# Patient Record
Sex: FEMALE | Race: WHITE | NOT HISPANIC OR LATINO | Employment: OTHER | URBAN - METROPOLITAN AREA
[De-identification: names, ages, dates, MRNs, and addresses within clinical notes are randomized per-mention and may not be internally consistent; named-entity substitution may affect disease eponyms.]

---

## 2021-12-20 ENCOUNTER — HOSPITAL ENCOUNTER (OUTPATIENT)
Facility: MEDICAL CENTER | Age: 81
End: 2021-12-21
Attending: EMERGENCY MEDICINE | Admitting: INTERNAL MEDICINE
Payer: MEDICARE

## 2021-12-20 ENCOUNTER — APPOINTMENT (OUTPATIENT)
Dept: RADIOLOGY | Facility: MEDICAL CENTER | Age: 81
End: 2021-12-20
Attending: EMERGENCY MEDICINE
Payer: MEDICARE

## 2021-12-20 ENCOUNTER — ANESTHESIA EVENT (OUTPATIENT)
Dept: SURGERY | Facility: MEDICAL CENTER | Age: 81
End: 2021-12-20
Payer: MEDICARE

## 2021-12-20 ENCOUNTER — ANESTHESIA (OUTPATIENT)
Dept: SURGERY | Facility: MEDICAL CENTER | Age: 81
End: 2021-12-20
Payer: MEDICARE

## 2021-12-20 DIAGNOSIS — K35.80 ACUTE APPENDICITIS, UNSPECIFIED ACUTE APPENDICITIS TYPE: ICD-10-CM

## 2021-12-20 DIAGNOSIS — R71.8 MICROCYTOSIS: ICD-10-CM

## 2021-12-20 PROBLEM — K37 APPENDICITIS: Status: ACTIVE | Noted: 2021-12-20

## 2021-12-20 PROBLEM — D72.829 LEUKOCYTOSIS: Status: ACTIVE | Noted: 2021-12-20

## 2021-12-20 PROBLEM — D75.839 THROMBOCYTOSIS: Status: ACTIVE | Noted: 2021-12-20

## 2021-12-20 PROBLEM — Z86.73 HISTORY OF STROKE: Status: ACTIVE | Noted: 2021-12-20

## 2021-12-20 PROBLEM — K80.20 CHOLELITHIASIS: Status: ACTIVE | Noted: 2021-12-20

## 2021-12-20 LAB
ALBUMIN SERPL BCP-MCNC: 4.1 G/DL (ref 3.2–4.9)
ALBUMIN/GLOB SERPL: 1 G/DL
ALP SERPL-CCNC: 111 U/L (ref 30–99)
ALT SERPL-CCNC: 14 U/L (ref 2–50)
AMORPH CRY #/AREA URNS HPF: PRESENT /HPF
ANION GAP SERPL CALC-SCNC: 13 MMOL/L (ref 7–16)
ANISOCYTOSIS BLD QL SMEAR: ABNORMAL
APPEARANCE UR: CLEAR
AST SERPL-CCNC: 19 U/L (ref 12–45)
BACTERIA #/AREA URNS HPF: NEGATIVE /HPF
BASOPHILS # BLD AUTO: 1 % (ref 0–1.8)
BASOPHILS # BLD: 0.18 K/UL (ref 0–0.12)
BILIRUB SERPL-MCNC: 0.4 MG/DL (ref 0.1–1.5)
BILIRUB UR QL STRIP.AUTO: NEGATIVE
BUN SERPL-MCNC: 15 MG/DL (ref 8–22)
CALCIUM SERPL-MCNC: 9.7 MG/DL (ref 8.4–10.2)
CHLORIDE SERPL-SCNC: 103 MMOL/L (ref 96–112)
CO2 SERPL-SCNC: 24 MMOL/L (ref 20–33)
COLOR UR: YELLOW
CREAT SERPL-MCNC: 0.77 MG/DL (ref 0.5–1.4)
EOSINOPHIL # BLD AUTO: 0.18 K/UL (ref 0–0.51)
EOSINOPHIL NFR BLD: 1 % (ref 0–6.9)
EPI CELLS #/AREA URNS HPF: ABNORMAL /HPF
ERYTHROCYTE [DISTWIDTH] IN BLOOD BY AUTOMATED COUNT: 51.7 FL (ref 35.9–50)
GLOBULIN SER CALC-MCNC: 4.3 G/DL (ref 1.9–3.5)
GLUCOSE SERPL-MCNC: 94 MG/DL (ref 65–99)
GLUCOSE UR STRIP.AUTO-MCNC: NEGATIVE MG/DL
HCT VFR BLD AUTO: 47.9 % (ref 37–47)
HGB BLD-MCNC: 13.9 G/DL (ref 12–16)
HYALINE CASTS #/AREA URNS LPF: ABNORMAL /LPF
HYPOCHROMIA BLD QL SMEAR: ABNORMAL
KETONES UR STRIP.AUTO-MCNC: NEGATIVE MG/DL
LEUKOCYTE ESTERASE UR QL STRIP.AUTO: NEGATIVE
LIPASE SERPL-CCNC: 14 U/L (ref 7–58)
LYMPHOCYTES # BLD AUTO: 3.28 K/UL (ref 1–4.8)
LYMPHOCYTES NFR BLD: 18 % (ref 22–41)
MANUAL DIFF BLD: NORMAL
MCH RBC QN AUTO: 20.3 PG (ref 27–33)
MCHC RBC AUTO-ENTMCNC: 29 G/DL (ref 33.6–35)
MCV RBC AUTO: 69.8 FL (ref 81.4–97.8)
MICRO URNS: ABNORMAL
MICROCYTES BLD QL SMEAR: ABNORMAL
MONOCYTES # BLD AUTO: 1.09 K/UL (ref 0–0.85)
MONOCYTES NFR BLD AUTO: 6 % (ref 0–13.4)
NEUTROPHILS # BLD AUTO: 13.47 K/UL (ref 2–7.15)
NEUTROPHILS NFR BLD: 74 % (ref 44–72)
NITRITE UR QL STRIP.AUTO: NEGATIVE
NRBC # BLD AUTO: 0 K/UL
NRBC BLD-RTO: 0 /100 WBC
PATHOLOGY CONSULT NOTE: NORMAL
PH UR STRIP.AUTO: 6 [PH] (ref 5–8)
PLATELET # BLD AUTO: 656 K/UL (ref 164–446)
PLATELET BLD QL SMEAR: NORMAL
PMV BLD AUTO: 10.7 FL (ref 9–12.9)
POTASSIUM SERPL-SCNC: 4.2 MMOL/L (ref 3.6–5.5)
PROT SERPL-MCNC: 8.4 G/DL (ref 6–8.2)
PROT UR QL STRIP: NEGATIVE MG/DL
RBC # BLD AUTO: 6.86 M/UL (ref 4.2–5.4)
RBC # URNS HPF: ABNORMAL /HPF
RBC BLD AUTO: PRESENT
RBC UR QL AUTO: ABNORMAL
SARS-COV+SARS-COV-2 AG RESP QL IA.RAPID: NOTDETECTED
SODIUM SERPL-SCNC: 140 MMOL/L (ref 135–145)
SP GR UR STRIP.AUTO: 1.01
SPECIMEN SOURCE: NORMAL
WBC # BLD AUTO: 18.2 K/UL (ref 4.8–10.8)
WBC #/AREA URNS HPF: ABNORMAL /HPF

## 2021-12-20 PROCEDURE — 160009 HCHG ANES TIME/MIN: Performed by: SURGERY

## 2021-12-20 PROCEDURE — 502703 HCHG DEVICE, LIGASURE V SEALER: Performed by: SURGERY

## 2021-12-20 PROCEDURE — 85027 COMPLETE CBC AUTOMATED: CPT

## 2021-12-20 PROCEDURE — 74177 CT ABD & PELVIS W/CONTRAST: CPT

## 2021-12-20 PROCEDURE — 501583 HCHG TROCAR, THRD CAN&SEAL 5X100: Performed by: SURGERY

## 2021-12-20 PROCEDURE — 93005 ELECTROCARDIOGRAM TRACING: CPT | Performed by: INTERNAL MEDICINE

## 2021-12-20 PROCEDURE — 501399 HCHG SPECIMAN BAG, ENDO CATC: Performed by: SURGERY

## 2021-12-20 PROCEDURE — 700111 HCHG RX REV CODE 636 W/ 250 OVERRIDE (IP): Performed by: INTERNAL MEDICINE

## 2021-12-20 PROCEDURE — 501429 HCHG STAPLER, ENDO GIA UNIV: Performed by: SURGERY

## 2021-12-20 PROCEDURE — 81001 URINALYSIS AUTO W/SCOPE: CPT

## 2021-12-20 PROCEDURE — 700117 HCHG RX CONTRAST REV CODE 255: Performed by: EMERGENCY MEDICINE

## 2021-12-20 PROCEDURE — 500868 HCHG NEEDLE, SURGI(VARES): Performed by: SURGERY

## 2021-12-20 PROCEDURE — 160035 HCHG PACU - 1ST 60 MINS PHASE I: Performed by: SURGERY

## 2021-12-20 PROCEDURE — G0378 HOSPITAL OBSERVATION PER HR: HCPCS

## 2021-12-20 PROCEDURE — A9270 NON-COVERED ITEM OR SERVICE: HCPCS | Performed by: INTERNAL MEDICINE

## 2021-12-20 PROCEDURE — 500378 HCHG DRAIN, J-VAC ROUND 19FR: Performed by: SURGERY

## 2021-12-20 PROCEDURE — 87426 SARSCOV CORONAVIRUS AG IA: CPT

## 2021-12-20 PROCEDURE — 501570 HCHG TROCAR, SEPARATOR: Performed by: SURGERY

## 2021-12-20 PROCEDURE — 700101 HCHG RX REV CODE 250: Performed by: STUDENT IN AN ORGANIZED HEALTH CARE EDUCATION/TRAINING PROGRAM

## 2021-12-20 PROCEDURE — 700105 HCHG RX REV CODE 258: Performed by: INTERNAL MEDICINE

## 2021-12-20 PROCEDURE — 501838 HCHG SUTURE GENERAL: Performed by: SURGERY

## 2021-12-20 PROCEDURE — 160002 HCHG RECOVERY MINUTES (STAT): Performed by: SURGERY

## 2021-12-20 PROCEDURE — 502571 HCHG PACK, LAP CHOLE: Performed by: SURGERY

## 2021-12-20 PROCEDURE — 88304 TISSUE EXAM BY PATHOLOGIST: CPT

## 2021-12-20 PROCEDURE — 83690 ASSAY OF LIPASE: CPT

## 2021-12-20 PROCEDURE — 700111 HCHG RX REV CODE 636 W/ 250 OVERRIDE (IP): Performed by: STUDENT IN AN ORGANIZED HEALTH CARE EDUCATION/TRAINING PROGRAM

## 2021-12-20 PROCEDURE — 500002 HCHG ADHESIVE, DERMABOND: Performed by: SURGERY

## 2021-12-20 PROCEDURE — 700102 HCHG RX REV CODE 250 W/ 637 OVERRIDE(OP): Performed by: INTERNAL MEDICINE

## 2021-12-20 PROCEDURE — 96365 THER/PROPH/DIAG IV INF INIT: CPT | Mod: XU

## 2021-12-20 PROCEDURE — 99220 PR INITIAL OBSERVATION CARE,LEVL III: CPT | Performed by: INTERNAL MEDICINE

## 2021-12-20 PROCEDURE — 36415 COLL VENOUS BLD VENIPUNCTURE: CPT

## 2021-12-20 PROCEDURE — 160041 HCHG SURGERY MINUTES - EA ADDL 1 MIN LEVEL 4: Performed by: SURGERY

## 2021-12-20 PROCEDURE — 80053 COMPREHEN METABOLIC PANEL: CPT

## 2021-12-20 PROCEDURE — 501463 HCHG STAPLES, UNIV. ROTIC: Performed by: SURGERY

## 2021-12-20 PROCEDURE — 160029 HCHG SURGERY MINUTES - 1ST 30 MINS LEVEL 4: Performed by: SURGERY

## 2021-12-20 PROCEDURE — 700101 HCHG RX REV CODE 250: Performed by: SURGERY

## 2021-12-20 PROCEDURE — 160048 HCHG OR STATISTICAL LEVEL 1-5: Performed by: SURGERY

## 2021-12-20 PROCEDURE — 99291 CRITICAL CARE FIRST HOUR: CPT

## 2021-12-20 PROCEDURE — 85007 BL SMEAR W/DIFF WBC COUNT: CPT

## 2021-12-20 RX ORDER — SODIUM CHLORIDE, SODIUM LACTATE, POTASSIUM CHLORIDE, CALCIUM CHLORIDE 600; 310; 30; 20 MG/100ML; MG/100ML; MG/100ML; MG/100ML
INJECTION, SOLUTION INTRAVENOUS CONTINUOUS
Status: DISCONTINUED | OUTPATIENT
Start: 2021-12-20 | End: 2021-12-20 | Stop reason: HOSPADM

## 2021-12-20 RX ORDER — BUPIVACAINE HYDROCHLORIDE AND EPINEPHRINE 5; 5 MG/ML; UG/ML
INJECTION, SOLUTION EPIDURAL; INTRACAUDAL; PERINEURAL
Status: DISCONTINUED | OUTPATIENT
Start: 2021-12-20 | End: 2021-12-20 | Stop reason: HOSPADM

## 2021-12-20 RX ORDER — OXYCODONE HYDROCHLORIDE 5 MG/1
2.5 TABLET ORAL
Status: DISCONTINUED | OUTPATIENT
Start: 2021-12-20 | End: 2021-12-21 | Stop reason: HOSPADM

## 2021-12-20 RX ORDER — ROSUVASTATIN CALCIUM 10 MG/1
5 TABLET, COATED ORAL DAILY
Status: DISCONTINUED | OUTPATIENT
Start: 2021-12-21 | End: 2021-12-21 | Stop reason: HOSPADM

## 2021-12-20 RX ORDER — ONDANSETRON 2 MG/ML
4 INJECTION INTRAMUSCULAR; INTRAVENOUS EVERY 4 HOURS PRN
Status: DISCONTINUED | OUTPATIENT
Start: 2021-12-20 | End: 2021-12-21 | Stop reason: HOSPADM

## 2021-12-20 RX ORDER — PHENYLEPHRINE HYDROCHLORIDE 10 MG/ML
INJECTION, SOLUTION INTRAMUSCULAR; INTRAVENOUS; SUBCUTANEOUS PRN
Status: DISCONTINUED | OUTPATIENT
Start: 2021-12-20 | End: 2021-12-20 | Stop reason: SURG

## 2021-12-20 RX ORDER — ONDANSETRON 2 MG/ML
4 INJECTION INTRAMUSCULAR; INTRAVENOUS
Status: DISCONTINUED | OUTPATIENT
Start: 2021-12-20 | End: 2021-12-20 | Stop reason: HOSPADM

## 2021-12-20 RX ORDER — ACETAMINOPHEN 325 MG/1
650 TABLET ORAL EVERY 6 HOURS PRN
Status: DISCONTINUED | OUTPATIENT
Start: 2021-12-20 | End: 2021-12-21 | Stop reason: HOSPADM

## 2021-12-20 RX ORDER — ROCURONIUM BROMIDE 10 MG/ML
INJECTION, SOLUTION INTRAVENOUS PRN
Status: DISCONTINUED | OUTPATIENT
Start: 2021-12-20 | End: 2021-12-20 | Stop reason: SURG

## 2021-12-20 RX ORDER — ROSUVASTATIN CALCIUM 5 MG/1
5 TABLET, COATED ORAL DAILY
COMMUNITY

## 2021-12-20 RX ORDER — LIDOCAINE HYDROCHLORIDE 20 MG/ML
INJECTION, SOLUTION EPIDURAL; INFILTRATION; INTRACAUDAL; PERINEURAL PRN
Status: DISCONTINUED | OUTPATIENT
Start: 2021-12-20 | End: 2021-12-20 | Stop reason: SURG

## 2021-12-20 RX ORDER — SODIUM CHLORIDE, SODIUM LACTATE, POTASSIUM CHLORIDE, CALCIUM CHLORIDE 600; 310; 30; 20 MG/100ML; MG/100ML; MG/100ML; MG/100ML
INJECTION, SOLUTION INTRAVENOUS CONTINUOUS
Status: DISCONTINUED | OUTPATIENT
Start: 2021-12-20 | End: 2021-12-20

## 2021-12-20 RX ORDER — AMOXICILLIN 250 MG
2 CAPSULE ORAL 2 TIMES DAILY
Status: DISCONTINUED | OUTPATIENT
Start: 2021-12-20 | End: 2021-12-21 | Stop reason: HOSPADM

## 2021-12-20 RX ORDER — HYDROMORPHONE HYDROCHLORIDE 1 MG/ML
0.1 INJECTION, SOLUTION INTRAMUSCULAR; INTRAVENOUS; SUBCUTANEOUS
Status: DISCONTINUED | OUTPATIENT
Start: 2021-12-20 | End: 2021-12-20 | Stop reason: HOSPADM

## 2021-12-20 RX ORDER — OXYCODONE HYDROCHLORIDE 5 MG/1
5 TABLET ORAL
Status: DISCONTINUED | OUTPATIENT
Start: 2021-12-20 | End: 2021-12-21 | Stop reason: HOSPADM

## 2021-12-20 RX ORDER — HYDROMORPHONE HYDROCHLORIDE 1 MG/ML
0.2 INJECTION, SOLUTION INTRAMUSCULAR; INTRAVENOUS; SUBCUTANEOUS
Status: DISCONTINUED | OUTPATIENT
Start: 2021-12-20 | End: 2021-12-20 | Stop reason: HOSPADM

## 2021-12-20 RX ORDER — CEFOTETAN DISODIUM 2 G/20ML
INJECTION, POWDER, FOR SOLUTION INTRAMUSCULAR; INTRAVENOUS PRN
Status: DISCONTINUED | OUTPATIENT
Start: 2021-12-20 | End: 2021-12-20 | Stop reason: SURG

## 2021-12-20 RX ORDER — ONDANSETRON 4 MG/1
4 TABLET, ORALLY DISINTEGRATING ORAL EVERY 4 HOURS PRN
Status: DISCONTINUED | OUTPATIENT
Start: 2021-12-20 | End: 2021-12-21 | Stop reason: HOSPADM

## 2021-12-20 RX ORDER — HALOPERIDOL 5 MG/ML
1 INJECTION INTRAMUSCULAR
Status: DISCONTINUED | OUTPATIENT
Start: 2021-12-20 | End: 2021-12-20 | Stop reason: HOSPADM

## 2021-12-20 RX ORDER — BISACODYL 10 MG
10 SUPPOSITORY, RECTAL RECTAL
Status: DISCONTINUED | OUTPATIENT
Start: 2021-12-20 | End: 2021-12-21 | Stop reason: HOSPADM

## 2021-12-20 RX ORDER — POLYETHYLENE GLYCOL 3350 17 G/17G
1 POWDER, FOR SOLUTION ORAL
Status: DISCONTINUED | OUTPATIENT
Start: 2021-12-20 | End: 2021-12-21 | Stop reason: HOSPADM

## 2021-12-20 RX ORDER — OXYCODONE HCL 5 MG/5 ML
5 SOLUTION, ORAL ORAL
Status: DISCONTINUED | OUTPATIENT
Start: 2021-12-20 | End: 2021-12-20 | Stop reason: HOSPADM

## 2021-12-20 RX ORDER — METRONIDAZOLE 500 MG/1
500 TABLET ORAL EVERY 8 HOURS
Status: DISCONTINUED | OUTPATIENT
Start: 2021-12-20 | End: 2021-12-21 | Stop reason: HOSPADM

## 2021-12-20 RX ORDER — DEXAMETHASONE SODIUM PHOSPHATE 4 MG/ML
INJECTION, SOLUTION INTRA-ARTICULAR; INTRALESIONAL; INTRAMUSCULAR; INTRAVENOUS; SOFT TISSUE PRN
Status: DISCONTINUED | OUTPATIENT
Start: 2021-12-20 | End: 2021-12-20 | Stop reason: SURG

## 2021-12-20 RX ORDER — LABETALOL HYDROCHLORIDE 5 MG/ML
10 INJECTION, SOLUTION INTRAVENOUS EVERY 4 HOURS PRN
Status: DISCONTINUED | OUTPATIENT
Start: 2021-12-20 | End: 2021-12-21 | Stop reason: HOSPADM

## 2021-12-20 RX ORDER — OXYCODONE HCL 5 MG/5 ML
10 SOLUTION, ORAL ORAL
Status: DISCONTINUED | OUTPATIENT
Start: 2021-12-20 | End: 2021-12-20 | Stop reason: HOSPADM

## 2021-12-20 RX ORDER — HYDROMORPHONE HYDROCHLORIDE 1 MG/ML
0.4 INJECTION, SOLUTION INTRAMUSCULAR; INTRAVENOUS; SUBCUTANEOUS
Status: DISCONTINUED | OUTPATIENT
Start: 2021-12-20 | End: 2021-12-20 | Stop reason: HOSPADM

## 2021-12-20 RX ORDER — SODIUM CHLORIDE, SODIUM LACTATE, POTASSIUM CHLORIDE, CALCIUM CHLORIDE 600; 310; 30; 20 MG/100ML; MG/100ML; MG/100ML; MG/100ML
INJECTION, SOLUTION INTRAVENOUS CONTINUOUS
Status: CANCELLED | OUTPATIENT
Start: 2021-12-20 | End: 2021-12-20

## 2021-12-20 RX ORDER — HYDROMORPHONE HYDROCHLORIDE 1 MG/ML
0.25 INJECTION, SOLUTION INTRAMUSCULAR; INTRAVENOUS; SUBCUTANEOUS
Status: DISCONTINUED | OUTPATIENT
Start: 2021-12-20 | End: 2021-12-21 | Stop reason: HOSPADM

## 2021-12-20 RX ADMIN — ONDANSETRON 4 MG: 2 INJECTION INTRAMUSCULAR; INTRAVENOUS at 19:27

## 2021-12-20 RX ADMIN — PHENYLEPHRINE HYDROCHLORIDE 100 MCG: 10 INJECTION INTRAVENOUS at 19:06

## 2021-12-20 RX ADMIN — FENTANYL CITRATE 50 MCG: 50 INJECTION, SOLUTION INTRAMUSCULAR; INTRAVENOUS at 18:54

## 2021-12-20 RX ADMIN — LIDOCAINE HYDROCHLORIDE 40 MG: 20 INJECTION, SOLUTION EPIDURAL; INFILTRATION; INTRACAUDAL; PERINEURAL at 18:57

## 2021-12-20 RX ADMIN — FENTANYL CITRATE 50 MCG: 50 INJECTION, SOLUTION INTRAMUSCULAR; INTRAVENOUS at 19:12

## 2021-12-20 RX ADMIN — METRONIDAZOLE 500 MG: 500 TABLET ORAL at 22:03

## 2021-12-20 RX ADMIN — CEFOTETAN DISODIUM 2 G: 2 INJECTION, POWDER, FOR SOLUTION INTRAMUSCULAR; INTRAVENOUS at 19:00

## 2021-12-20 RX ADMIN — FENTANYL CITRATE 50 MCG: 50 INJECTION, SOLUTION INTRAMUSCULAR; INTRAVENOUS at 19:18

## 2021-12-20 RX ADMIN — ROCURONIUM BROMIDE 50 MG: 10 INJECTION, SOLUTION INTRAVENOUS at 18:57

## 2021-12-20 RX ADMIN — IOHEXOL 100 ML: 350 INJECTION, SOLUTION INTRAVENOUS at 17:14

## 2021-12-20 RX ADMIN — SUGAMMADEX 200 MG: 100 INJECTION, SOLUTION INTRAVENOUS at 19:31

## 2021-12-20 RX ADMIN — DEXAMETHASONE SODIUM PHOSPHATE 4 MG: 4 INJECTION, SOLUTION INTRAMUSCULAR; INTRAVENOUS at 19:09

## 2021-12-20 RX ADMIN — PROPOFOL 130 MG: 10 INJECTION, EMULSION INTRAVENOUS at 18:57

## 2021-12-20 RX ADMIN — SODIUM CHLORIDE, POTASSIUM CHLORIDE, SODIUM LACTATE AND CALCIUM CHLORIDE: 600; 310; 30; 20 INJECTION, SOLUTION INTRAVENOUS at 18:51

## 2021-12-20 ASSESSMENT — LIFESTYLE VARIABLES
HAVE YOU EVER FELT YOU SHOULD CUT DOWN ON YOUR DRINKING: NO
EVER HAD A DRINK FIRST THING IN THE MORNING TO STEADY YOUR NERVES TO GET RID OF A HANGOVER: NO
HOW MANY TIMES IN THE PAST YEAR HAVE YOU HAD 5 OR MORE DRINKS IN A DAY: 0
ON A TYPICAL DAY WHEN YOU DRINK ALCOHOL HOW MANY DRINKS DO YOU HAVE: 0
EVER FELT BAD OR GUILTY ABOUT YOUR DRINKING: NO
CONSUMPTION TOTAL: NEGATIVE
TOTAL SCORE: 0
TOTAL SCORE: 0
ALCOHOL_USE: NO
HAVE PEOPLE ANNOYED YOU BY CRITICIZING YOUR DRINKING: NO
AVERAGE NUMBER OF DAYS PER WEEK YOU HAVE A DRINK CONTAINING ALCOHOL: 0
TOTAL SCORE: 0

## 2021-12-20 ASSESSMENT — ENCOUNTER SYMPTOMS
FEVER: 0
SORE THROAT: 0
SEIZURES: 0
EYE REDNESS: 0
MEMORY LOSS: 0
COUGH: 0
LOSS OF CONSCIOUSNESS: 0
ABDOMINAL PAIN: 1
FALLS: 0
NAUSEA: 0
VOMITING: 0
CHILLS: 0
SHORTNESS OF BREATH: 0
PALPITATIONS: 0
EYE PAIN: 0

## 2021-12-20 ASSESSMENT — COGNITIVE AND FUNCTIONAL STATUS - GENERAL
MOVING FROM LYING ON BACK TO SITTING ON SIDE OF FLAT BED: A LITTLE
STANDING UP FROM CHAIR USING ARMS: A LITTLE
WALKING IN HOSPITAL ROOM: A LITTLE
MOVING TO AND FROM BED TO CHAIR: A LITTLE
DAILY ACTIVITIY SCORE: 18
TURNING FROM BACK TO SIDE WHILE IN FLAT BAD: A LITTLE
MOBILITY SCORE: 18
EATING MEALS: A LITTLE
PERSONAL GROOMING: A LITTLE
HELP NEEDED FOR BATHING: A LITTLE
SUGGESTED CMS G CODE MODIFIER DAILY ACTIVITY: CK
SUGGESTED CMS G CODE MODIFIER MOBILITY: CK
CLIMB 3 TO 5 STEPS WITH RAILING: A LITTLE
DRESSING REGULAR LOWER BODY CLOTHING: A LITTLE
TOILETING: A LITTLE
DRESSING REGULAR UPPER BODY CLOTHING: A LITTLE

## 2021-12-20 ASSESSMENT — PAIN DESCRIPTION - PAIN TYPE
TYPE: SURGICAL PAIN
TYPE: ACUTE PAIN;SURGICAL PAIN

## 2021-12-20 ASSESSMENT — PATIENT HEALTH QUESTIONNAIRE - PHQ9
1. LITTLE INTEREST OR PLEASURE IN DOING THINGS: NOT AT ALL
2. FEELING DOWN, DEPRESSED, IRRITABLE, OR HOPELESS: NOT AT ALL
SUM OF ALL RESPONSES TO PHQ9 QUESTIONS 1 AND 2: 0

## 2021-12-20 NOTE — ED TRIAGE NOTES
82 yo female presents to  Triage with reports of sent over from doctors office for RLQ abdominal pain since Saturday.  Patient denies N/V reports some constipation as well.  Denies fever.      Patient is Covid vaccinated

## 2021-12-20 NOTE — ED NOTES
PIV placed, labs collected, son at bedside    Pt resting on gurney, pt in no acute distress, pt provided call light, instructed to call if needing any assistance, instructed not to get up by self, arabella in lowest position.    ERP at [facility]

## 2021-12-21 VITALS
BODY MASS INDEX: 25.07 KG/M2 | WEIGHT: 146.83 LBS | OXYGEN SATURATION: 93 % | RESPIRATION RATE: 18 BRPM | HEART RATE: 81 BPM | TEMPERATURE: 98.5 F | DIASTOLIC BLOOD PRESSURE: 61 MMHG | HEIGHT: 64 IN | SYSTOLIC BLOOD PRESSURE: 129 MMHG

## 2021-12-21 PROBLEM — K37 APPENDICITIS: Status: RESOLVED | Noted: 2021-12-20 | Resolved: 2021-12-21

## 2021-12-21 LAB
ALBUMIN SERPL BCP-MCNC: 3.9 G/DL (ref 3.2–4.9)
ALBUMIN/GLOB SERPL: 1 G/DL
ALP SERPL-CCNC: 98 U/L (ref 30–99)
ALT SERPL-CCNC: 12 U/L (ref 2–50)
ANION GAP SERPL CALC-SCNC: 12 MMOL/L (ref 7–16)
ANISOCYTOSIS BLD QL SMEAR: ABNORMAL
AST SERPL-CCNC: 15 U/L (ref 12–45)
BASOPHILS # BLD AUTO: 0 % (ref 0–1.8)
BASOPHILS # BLD: 0 K/UL (ref 0–0.12)
BILIRUB SERPL-MCNC: 0.3 MG/DL (ref 0.1–1.5)
BUN SERPL-MCNC: 11 MG/DL (ref 8–22)
CALCIUM SERPL-MCNC: 9.6 MG/DL (ref 8.4–10.2)
CHLORIDE SERPL-SCNC: 104 MMOL/L (ref 96–112)
CO2 SERPL-SCNC: 22 MMOL/L (ref 20–33)
CREAT SERPL-MCNC: 0.58 MG/DL (ref 0.5–1.4)
EKG IMPRESSION: NORMAL
EOSINOPHIL # BLD AUTO: 0.2 K/UL (ref 0–0.51)
EOSINOPHIL NFR BLD: 1 % (ref 0–6.9)
ERYTHROCYTE [DISTWIDTH] IN BLOOD BY AUTOMATED COUNT: 51.4 FL (ref 35.9–50)
FERRITIN SERPL-MCNC: 25.3 NG/ML (ref 10–291)
GLOBULIN SER CALC-MCNC: 4 G/DL (ref 1.9–3.5)
GLUCOSE SERPL-MCNC: 142 MG/DL (ref 65–99)
HCT VFR BLD AUTO: 47.7 % (ref 37–47)
HGB BLD-MCNC: 13.9 G/DL (ref 12–16)
HYPOCHROMIA BLD QL SMEAR: ABNORMAL
IRON SATN MFR SERPL: 5 % (ref 15–55)
IRON SERPL-MCNC: 16 UG/DL (ref 40–170)
LYMPHOCYTES # BLD AUTO: 3.56 K/UL (ref 1–4.8)
LYMPHOCYTES NFR BLD: 18 % (ref 22–41)
MAGNESIUM SERPL-MCNC: 2 MG/DL (ref 1.5–2.5)
MANUAL DIFF BLD: NORMAL
MCH RBC QN AUTO: 20.2 PG (ref 27–33)
MCHC RBC AUTO-ENTMCNC: 29.1 G/DL (ref 33.6–35)
MCV RBC AUTO: 69.4 FL (ref 81.4–97.8)
MICROCYTES BLD QL SMEAR: ABNORMAL
MONOCYTES # BLD AUTO: 0.99 K/UL (ref 0–0.85)
MONOCYTES NFR BLD AUTO: 5 % (ref 0–13.4)
NEUTROPHILS # BLD AUTO: 15.05 K/UL (ref 2–7.15)
NEUTROPHILS NFR BLD: 76 % (ref 44–72)
NRBC # BLD AUTO: 0 K/UL
NRBC BLD-RTO: 0 /100 WBC
PHOSPHATE SERPL-MCNC: 3.6 MG/DL (ref 2.5–4.5)
PLATELET # BLD AUTO: 667 K/UL (ref 164–446)
PLATELET BLD QL SMEAR: NORMAL
PMV BLD AUTO: 10.8 FL (ref 9–12.9)
POTASSIUM SERPL-SCNC: 4.4 MMOL/L (ref 3.6–5.5)
PROT SERPL-MCNC: 7.9 G/DL (ref 6–8.2)
RBC # BLD AUTO: 6.87 M/UL (ref 4.2–5.4)
RBC BLD AUTO: PRESENT
SODIUM SERPL-SCNC: 138 MMOL/L (ref 135–145)
TIBC SERPL-MCNC: 351 UG/DL (ref 250–450)
UIBC SERPL-MCNC: 335 UG/DL (ref 110–370)
WBC # BLD AUTO: 19.8 K/UL (ref 4.8–10.8)

## 2021-12-21 PROCEDURE — 93010 ELECTROCARDIOGRAM REPORT: CPT | Performed by: INTERNAL MEDICINE

## 2021-12-21 PROCEDURE — 83540 ASSAY OF IRON: CPT

## 2021-12-21 PROCEDURE — 82728 ASSAY OF FERRITIN: CPT

## 2021-12-21 PROCEDURE — 700111 HCHG RX REV CODE 636 W/ 250 OVERRIDE (IP): Performed by: INTERNAL MEDICINE

## 2021-12-21 PROCEDURE — 700105 HCHG RX REV CODE 258: Performed by: INTERNAL MEDICINE

## 2021-12-21 PROCEDURE — 85027 COMPLETE CBC AUTOMATED: CPT

## 2021-12-21 PROCEDURE — 99217 PR OBSERVATION CARE DISCHARGE: CPT | Performed by: FAMILY MEDICINE

## 2021-12-21 PROCEDURE — 85007 BL SMEAR W/DIFF WBC COUNT: CPT

## 2021-12-21 PROCEDURE — 96367 TX/PROPH/DG ADDL SEQ IV INF: CPT

## 2021-12-21 PROCEDURE — G0378 HOSPITAL OBSERVATION PER HR: HCPCS

## 2021-12-21 PROCEDURE — 83735 ASSAY OF MAGNESIUM: CPT

## 2021-12-21 PROCEDURE — A9270 NON-COVERED ITEM OR SERVICE: HCPCS | Performed by: INTERNAL MEDICINE

## 2021-12-21 PROCEDURE — 83550 IRON BINDING TEST: CPT

## 2021-12-21 PROCEDURE — 84100 ASSAY OF PHOSPHORUS: CPT

## 2021-12-21 PROCEDURE — 36415 COLL VENOUS BLD VENIPUNCTURE: CPT

## 2021-12-21 PROCEDURE — 700102 HCHG RX REV CODE 250 W/ 637 OVERRIDE(OP): Performed by: INTERNAL MEDICINE

## 2021-12-21 PROCEDURE — 80053 COMPREHEN METABOLIC PANEL: CPT

## 2021-12-21 RX ORDER — METRONIDAZOLE 500 MG/1
500 TABLET ORAL EVERY 8 HOURS
Qty: 21 TABLET | Refills: 0 | Status: SHIPPED | OUTPATIENT
Start: 2021-12-21 | End: 2021-12-28

## 2021-12-21 RX ORDER — FERROUS SULFATE 325(65) MG
325 TABLET ORAL DAILY
Qty: 30 TABLET | Refills: 0 | Status: SHIPPED | OUTPATIENT
Start: 2021-12-21 | End: 2021-12-21

## 2021-12-21 RX ORDER — CEFDINIR 300 MG/1
300 CAPSULE ORAL 2 TIMES DAILY
Qty: 14 CAPSULE | Refills: 0 | Status: SHIPPED | OUTPATIENT
Start: 2021-12-21 | End: 2021-12-28

## 2021-12-21 RX ADMIN — CEFTRIAXONE SODIUM 1 G: 1 INJECTION, POWDER, FOR SOLUTION INTRAMUSCULAR; INTRAVENOUS at 06:00

## 2021-12-21 RX ADMIN — ACETAMINOPHEN 650 MG: 325 TABLET, FILM COATED ORAL at 12:48

## 2021-12-21 RX ADMIN — ACETAMINOPHEN 650 MG: 325 TABLET, FILM COATED ORAL at 06:00

## 2021-12-21 RX ADMIN — SENNOSIDES AND DOCUSATE SODIUM 2 TABLET: 50; 8.6 TABLET ORAL at 06:01

## 2021-12-21 RX ADMIN — ROSUVASTATIN 5 MG: 10 TABLET, FILM COATED ORAL at 06:01

## 2021-12-21 RX ADMIN — METRONIDAZOLE 500 MG: 500 TABLET ORAL at 06:01

## 2021-12-21 ASSESSMENT — PAIN DESCRIPTION - PAIN TYPE
TYPE: SURGICAL PAIN
TYPE: SURGICAL PAIN

## 2021-12-21 NOTE — ANESTHESIA POSTPROCEDURE EVALUATION
Patient: Rose Marie Romero    Procedure Summary     Date: 12/20/21 Room / Location: [place] [address] / SURGERY [facility]    Anesthesia Start: 1851 Anesthesia Stop: 1940    Procedure: APPENDECTOMY, LAPAROSCOPIC (N/A Abdomen) Diagnosis: (acute appendicitis)    Surgeons: Berhane Arango M.D. Responsible Provider: Kevin Ryan M.D.    Anesthesia Type: general ASA Status: 2          Final Anesthesia Type: general  Last vitals  BP   Blood Pressure : 160/70    Temp   36.1 °C (97 °F)    Pulse   78   Resp   16    SpO2   93 %      Anesthesia Post Evaluation    Patient location during evaluation: PACU  Patient participation: complete - patient participated  Level of consciousness: awake and alert    Airway patency: patent  Anesthetic complications: no  Cardiovascular status: hemodynamically stable  Respiratory status: acceptable  Hydration status: euvolemic    PONV: none          No complications documented.     Nurse Pain Score: 3 (NPRS)

## 2021-12-21 NOTE — PROGRESS NOTES
Received bedside report. Assumed pt care. Assessment and chart check complete.   Pt is AA0X4, no signs of distress, denies nausea/vomiting and pain at this moment.   X3 lap abdominal  stabs, dermabond CDI.  0900- From Dr. Nba leal to discharge today, follow up given thru voalte,  Fall precautions in place, treaded socks on pt, bed in low position. Call light within reach. Educated pt to call if needing anything.

## 2021-12-21 NOTE — ANESTHESIA PROCEDURE NOTES
Airway    Date/Time: 12/20/2021 6:59 PM  Performed by: Kevin Ryan M.D.  Authorized by: Kevin Ryan M.D.     Location:  OR  Urgency:  Elective  Indications for Airway Management:  Anesthesia      Spontaneous Ventilation: absent    Sedation Level:  Deep  Preoxygenated: Yes    Patient Position:  Sniffing  Final Airway Type:  Endotracheal airway  Final Endotracheal Airway:  ETT  Cuffed: Yes    Technique Used for Successful ETT Placement:  Direct laryngoscopy    Insertion Site:  Oral  Blade Type:  Ceferino  Laryngoscope Blade/Videolaryngoscope Blade Size:  3  ETT Size (mm):  7.0  Measured from:  Teeth  ETT to Teeth (cm):  22  Placement Verified by: auscultation and capnometry    Cormack-Lehane Classification:  Grade IIa - partial view of glottis  Number of Attempts at Approach:  1

## 2021-12-21 NOTE — ED NOTES
Med rec updated and complete, per pt and [facility] from [facility] [phone]  Allergies reviewed, per pt

## 2021-12-21 NOTE — ED PROVIDER NOTES
ED Provider Note    CHIEF COMPLAINT  Chief Complaint   Patient presents with   • Abdominal Pain     since satruday RLQ       HPI  Rose Marie Romero is a 81 y.o. female who presents to the emergency department with right lower quadrant abdominal pain. Past medical history as documented below. Earlier today presented to urgent care which ultimately sent her to the ER with focal right lower quadrant discomfort. She does note that she has had prior hysterectomy but no other abdominal surgeries such as appendectomy or cholecystectomy she is not had a nausea vomiting. She has been constipated. No urinary changes from baseline other than slight frequency and urgency but states that this is her typical. No fever chills. Pain currently mild. Pain is crampy in nature. Becomes more moderate at times often after bending over.    REVIEW OF SYSTEMS  See HPI for further details. All other systems are negative.     PAST MEDICAL HISTORY   has a past medical history of Bursitis, IBS (irritable bowel syndrome), and Polycythemia rubra vera (HCC).    SOCIAL HISTORY  Social History     Tobacco Use   • Smoking status: Never Smoker   • Smokeless tobacco: Never Used   Vaping Use   • Vaping Use: Never used   Substance and Sexual Activity   • Alcohol use: Never   • Drug use: Never   • Sexual activity: Not on file       SURGICAL HISTORY   has a past surgical history that includes gyn surgery and other.    CURRENT MEDICATIONS  Home Medications     Reviewed by Darby Velez R.N. (Registered Nurse) on 12/20/21 at 1824  Med List Status: Complete   Medication Last Dose Status   Acetaminophen (TYLENOL PO) >1 week Active   acetaminophen (Tylenol) tablet 650 mg  Active   aspirin EC (ECOTRIN) 81 MG Tablet Delayed Response 12/20/2021 Active   bisacodyl (DULCOLAX) suppository 10 mg  Active   Cholecalciferol (D3 PO) 12/20/2021 Active   enoxaparin (LOVENOX) inj 40 mg  Active   HYDROmorphone (Dilaudid) injection 0.25 mg  Active   labetalol  "(NORMODYNE/TRANDATE) injection 10 mg  Active   lactated ringers infusion  Active   magnesium hydroxide (MILK OF MAGNESIA) suspension 30 mL  Active   MAGNESIUM PO 12/19/2021 Active   ondansetron (ZOFRAN ODT) dispertab 4 mg  Active   ondansetron (ZOFRAN) syringe/vial injection 4 mg  Active   oxyCODONE immediate-release (ROXICODONE) tablet 2.5 mg  Active   oxyCODONE immediate-release (ROXICODONE) tablet 5 mg  Active   Pharmacy Consult Request ...Pain Management Review 1 Each  Active   polyethylene glycol/lytes (MIRALAX) PACKET 1 Packet  Active   rosuvastatin (CRESTOR) 5 MG Tab 12/20/2021 Active   rosuvastatin (CRESTOR) tablet 5 mg  Active   senna-docusate (PERICOLACE or SENOKOT S) 8.6-50 MG per tablet 2 Tablet  Active                ALLERGIES  Allergies   Allergen Reactions   • Doxycycline      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   • Morphine Itching   • Other Drug Itching     Any medications that effect eyes    • Penicillins Nausea       PHYSICAL EXAM  VITAL SIGNS: /70   Pulse 78   Temp 36.1 °C (97 °F) (Temporal)   Resp 16   Ht 1.626 m (5' 4\")   Wt 66.6 kg (146 lb 13.2 oz)   SpO2 93%   BMI 25.20 kg/m²  @CHAIM[[id number]::@   Pulse ox interpretation: I interpret this pulse ox as normal.  Constitutional: Alert in no apparent distress.  HENT: No signs of trauma, Bilateral external ears normal, Nose normal.   Eyes: Pupils are equal and reactive  Neck: Normal range of motion, No tenderness, Supple  Cardiovascular: Regular rate and rhythm, no murmurs.   Thorax & Lungs: Normal breath sounds, No respiratory distress, No wheezing, No chest tenderness.   Abdomen: Bowel sounds normal, Soft, focal RLQ tenderness w/o guarding or rebound  Skin: Warm, Dry, No erythema, No rash.   Extremities: Intact distal pulses  Musculoskeletal: Good range of motion in all major joints. No tenderness to palpation or major deformities noted.   Neurologic: Alert , Normal motor function, Normal sensory function, No focal " deficits noted.   Psychiatric: Affect normal, Judgment normal, Mood normal.       DIAGNOSTIC STUDIES / PROCEDURES    LABS  Results for orders placed or performed during the hospital encounter of 12/20/21   CBC WITH DIFFERENTIAL   Result Value Ref Range    WBC 18.2 (H) 4.8 - 10.8 K/uL    RBC 6.86 (H) 4.20 - 5.40 M/uL    Hemoglobin 13.9 12.0 - 16.0 g/dL    Hematocrit 47.9 (H) 37.0 - 47.0 %    MCV 69.8 (L) 81.4 - 97.8 fL    MCH 20.3 (L) 27.0 - 33.0 pg    MCHC 29.0 (L) 33.6 - 35.0 g/dL    RDW 51.7 (H) 35.9 - 50.0 fL    Platelet Count 656 (H) 164 - 446 K/uL    MPV 10.7 9.0 - 12.9 fL    Neutrophils-Polys 74.00 (H) 44.00 - 72.00 %    Lymphocytes 18.00 (L) 22.00 - 41.00 %    Monocytes 6.00 0.00 - 13.40 %    Eosinophils 1.00 0.00 - 6.90 %    Basophils 1.00 0.00 - 1.80 %    Nucleated RBC 0.00 /100 WBC    Neutrophils (Absolute) 13.47 (H) 2.00 - 7.15 K/uL    Lymphs (Absolute) 3.28 1.00 - 4.80 K/uL    Monos (Absolute) 1.09 (H) 0.00 - 0.85 K/uL    Eos (Absolute) 0.18 0.00 - 0.51 K/uL    Baso (Absolute) 0.18 (H) 0.00 - 0.12 K/uL    NRBC (Absolute) 0.00 K/uL    Hypochromia 1+     Anisocytosis 1+     Microcytosis 2+ (A)    COMP METABOLIC PANEL   Result Value Ref Range    Sodium 140 135 - 145 mmol/L    Potassium 4.2 3.6 - 5.5 mmol/L    Chloride 103 96 - 112 mmol/L    Co2 24 20 - 33 mmol/L    Anion Gap 13.0 7.0 - 16.0    Glucose 94 65 - 99 mg/dL    Bun 15 8 - 22 mg/dL    Creatinine 0.77 0.50 - 1.40 mg/dL    Calcium 9.7 8.4 - 10.2 mg/dL    AST(SGOT) 19 12 - 45 U/L    ALT(SGPT) 14 2 - 50 U/L    Alkaline Phosphatase 111 (H) 30 - 99 U/L    Total Bilirubin 0.4 0.1 - 1.5 mg/dL    Albumin 4.1 3.2 - 4.9 g/dL    Total Protein 8.4 (H) 6.0 - 8.2 g/dL    Globulin 4.3 (H) 1.9 - 3.5 g/dL    A-G Ratio 1.0 g/dL   LIPASE   Result Value Ref Range    Lipase 14 7 - 58 U/L   URINALYSIS    Specimen: Blood   Result Value Ref Range    Color Yellow     Character Clear     Specific Gravity 1.015 <1.035    Ph 6.0 5.0 - 8.0    Glucose Negative Negative mg/dL     Ketones Negative Negative mg/dL    Protein Negative Negative mg/dL    Bilirubin Negative Negative    Nitrite Negative Negative    Leukocyte Esterase Negative Negative    Occult Blood Trace (A) Negative    Micro Urine Req Microscopic    URINE MICROSCOPIC (W/UA)   Result Value Ref Range    WBC 0-2 /hpf    RBC 2-5 (A) /hpf    Bacteria Negative None /hpf    Epithelial Cells Rare Few /hpf    Amorphous Crystal Present /hpf    Hyaline Cast 0-2 /lpf   DIFFERENTIAL MANUAL   Result Value Ref Range    Manual Diff Status PERFORMED    PLATELET ESTIMATE   Result Value Ref Range    Plt Estimation Increased    MORPHOLOGY   Result Value Ref Range    RBC Morphology Present    ESTIMATED GFR   Result Value Ref Range    GFR If African American >60 >60 mL/min/1.73 m 2    GFR If Non African American >60 >60 mL/min/1.73 m 2   SARS-COV Antigen ELIAZAR: Collect dry nasal swab   Result Value Ref Range    SARS-CoV-2 Source Nasal Swab     SARS-COV ANTIGEN ELIAZAR NotDetected NotDetected   Histology Request   Result Value Ref Range    Pathology Request Sent to [facility]          RADIOLOGY  CT-ABDOMEN-PELVIS WITH   Final Result      1.  Acute appendicitis without evidence of perforation or intra-abdominal abscess.   2.  Cholelithiasis.   3.  Colonic diverticulosis.          1740: d/w Dr. Arango, will likely take to OR tonight.     COURSE & MEDICAL DECISION MAKING  Pertinent Labs & Imaging studies reviewed. (See chart for details)  81-year-old female presented to the emergency department for right lower quadrant pain. History above. CT confirming appendicitis. Discussed case with general surgery as document above. Additionally discussed case with the hospitals which were primarily mid postoperatively. Patient has been kept NPO. Rapid COVID screening has also been initiated and negative.     FINAL IMPRESSION  1. Acute appendicitis, unspecified acute appendicitis type            Electronically signed by: Lemuel Dahl M.D., 12/20/2021 5:03 PM

## 2021-12-21 NOTE — H&P
Hospital Medicine History & Physical Note    Date of Service  12/20/2021    Primary Care Physician  No primary care provider on file.    Consultants  general surgery    Specialist Names: Dr. Arango    Code Status  Full Code    Chief Complaint  Chief Complaint   Patient presents with   • Abdominal Pain     since satruday RLQ       History of Presenting Illness  Rose Marie Romero is a 81 y.o. female who presented 12/20/2021 with right lower quadrant abdominal pain. Medical history significant for stroke and residual left eye vision compromise.  Patient reports onset of right lower quadrant abdominal pain approximately 2 days ago.  Patient denies nausea, vomiting, fever, chills.  Patient describes pain as a nagging sensation and has remained localized to the right lower quadrant.  Patient denies changes in bowel habits, no diarrhea or constipation.  Upon presentation to the ED.  Presenting vitals: /85, HR 86, RR 18, T 98.5.  Her laboratory evaluation was significant for a leukocytosis with WBC of 18.2 platelet 656.  CTAP showed acute appendicitis without perforation or abscess. General surgery was consulted for surgical intervention.     Review of Systems  Review of Systems   Constitutional: Negative for chills and fever.   HENT: Negative for congestion and sore throat.    Eyes: Negative for pain and redness.   Respiratory: Negative for cough and shortness of breath.    Cardiovascular: Negative for chest pain and palpitations.   Gastrointestinal: Positive for abdominal pain. Negative for nausea and vomiting.   Genitourinary: Negative for dysuria and frequency.   Musculoskeletal: Negative for falls.   Skin: Negative for rash.   Neurological: Negative for seizures and loss of consciousness.   Psychiatric/Behavioral: Negative for memory loss.       Past Medical History   has a past medical history of Bursitis, IBS (irritable bowel syndrome), and Polycythemia rubra vera (HCC).    Surgical History   has a past  surgical history that includes gyn surgery and other.     Family History  family history is not on file.   Family history reviewed with patient. There is no family history that is pertinent to the chief complaint.     Social History   reports that she has never smoked. She has never used smokeless tobacco. She reports that she does not drink alcohol and does not use drugs.    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   • Morphine Itching   • Other Drug Itching     Any medications that effect eyes    • Penicillins Nausea       Medications  Prior to Admission Medications   Prescriptions Last Dose Informant Patient Reported? Taking?   Acetaminophen (TYLENOL PO) >1 week at Unknown Patient Yes Yes   Sig: Take 2 Tablets by mouth 2 times a day as needed (For pain). Pt is not sure the strength   Cholecalciferol (D3 PO) 12/20/2021 at 1130 Patient Yes Yes   Sig: Take 1 Capsule by mouth every day.   MAGNESIUM PO 12/19/2021 at 1900 Patient Yes Yes   Sig: Take 1 Capsule by mouth every evening.   aspirin EC (ECOTRIN) 81 MG Tablet Delayed Response 12/20/2021 at 1130 Patient Yes Yes   Sig: Take 162 mg by mouth every day.   rosuvastatin (CRESTOR) 5 MG Tab 12/20/2021 at 1130 Patient's Home Pharmacy Yes Yes   Sig: Take 5 mg by mouth every day.      Facility-Administered Medications: None       Physical Exam  Temp:  [36.9 °C (98.5 °F)] 36.9 °C (98.5 °F)  Pulse:  [79-87] 80  Resp:  [18] 18  BP: (155-180)/(76-89) 155/76  SpO2:  [95 %-96 %] 95 %  Blood Pressure : 155/76   Temperature: 36.9 °C (98.5 °F)   Pulse: 80   Respiration: 18   Pulse Oximetry: 95 %       Physical Exam  Constitutional:       General: She is not in acute distress.     Appearance: She is not toxic-appearing.   HENT:      Head: Normocephalic.      Right Ear: External ear normal.      Left Ear: External ear normal.      Nose: Nose normal. No congestion.      Mouth/Throat:      Mouth: Mucous  membranes are moist.      Pharynx: No oropharyngeal exudate.   Eyes:      General: No scleral icterus.     Pupils: Pupils are equal, round, and reactive to light.   Cardiovascular:      Rate and Rhythm: Normal rate and regular rhythm.      Heart sounds: No murmur heard.      Pulmonary:      Breath sounds: No wheezing.   Abdominal:      Palpations: Abdomen is soft.      Tenderness: There is abdominal tenderness (right lower quadrant pain with palpaiton). There is no guarding or rebound.   Musculoskeletal:         General: No swelling or deformity.   Skin:     Coloration: Skin is not jaundiced.      Findings: No bruising.   Neurological:      General: No focal deficit present.      Mental Status: She is alert and oriented to person, place, and time.      Motor: No weakness.   Psychiatric:         Mood and Affect: Mood normal.         Behavior: Behavior normal.         Thought Content: Thought content normal.         Judgment: Judgment normal.         Laboratory:  Recent Labs     12/20/21  1544   WBC 18.2*   RBC 6.86*   HEMOGLOBIN 13.9   HEMATOCRIT 47.9*   MCV 69.8*   MCH 20.3*   MCHC 29.0*   RDW 51.7*   PLATELETCT 656*   MPV 10.7     Recent Labs     12/20/21  1544   SODIUM 140   POTASSIUM 4.2   CHLORIDE 103   CO2 24   GLUCOSE 94   BUN 15   CREATININE 0.77   CALCIUM 9.7     Recent Labs     12/20/21  1544   ALTSGPT 14   ASTSGOT 19   ALKPHOSPHAT 111*   TBILIRUBIN 0.4   LIPASE 14   GLUCOSE 94         No results for input(s): NTPROBNP in the last 72 hours.      No results for input(s): TROPONINT in the last 72 hours.    Imaging:  CT-ABDOMEN-PELVIS WITH   Final Result      1.  Acute appendicitis without evidence of perforation or intra-abdominal abscess.   2.  Cholelithiasis.   3.  Colonic diverticulosis.          Assessment/Plan:  I anticipate this patient is appropriate for observation status at this time.    * Appendicitis- (present on admission)  Assessment & Plan  Right lower quadrant abdominal pain x2 days  Acute  appendicitis without evidence of perforation or intra-abdominal abscess.  WBC 18.2  Rocephin/flagyl  General surgery consulted    Microcytosis  Assessment & Plan  MCV 69.8  Hb 13.9  Obtain iron studies    Cholelithiasis  Assessment & Plan  As demonstrated on imaging  Elevated alk phos 111  No intervention at this time    History of stroke  Assessment & Plan  Residual left eye vision loss  statin    Thrombocytosis  Assessment & Plan  Plt 656  Likely reactive  monitor    Leukocytosis  Assessment & Plan  WBC 18.2  Rocephin/Flagyl  monitor      VTE prophylaxis: enoxaparin ppx

## 2021-12-21 NOTE — CARE PLAN
The patient is Stable - Low risk of patient condition declining or worsening    Shift Goals  Clinical Goals: Manage surgical pain   Patient Goals: Able to void     Progress made toward(s) clinical / shift goals:   Problem: Pain - Standard  Goal: Alleviation of pain or a reduction in pain to the patient’s comfort goal  Outcome: Progressing  Note: Pt stated that surgical pain(abdomen) is tolerable   Encouraged pt to report to the nurse if experience pain or pain remains uncontrolled for appropriate treatment; pt verbalized understanding      Problem: Discharge Barriers/Planning  Goal: Patient's continuum of care needs are met  Outcome: Progressing  Note: Pt might get to discharge in AM

## 2021-12-21 NOTE — PROGRESS NOTES
4 Eyes Skin Assessment Completed by Thu, RN and JOHANA Pastor.    Head WDL  Ears WDL  Nose WDL  Mouth WDL  Neck WDL  Breast/Chest WDL  Shoulder Blades WDL  Spine WDL  (R) Arm/Elbow/Hand WDL  (L) Arm/Elbow/Hand WDL  Abdomen : x3 lap sites, dermabond, CDI  Groin WDL  Scrotum/Coccyx/Buttocks WDL  (R) Leg WDL  (L) Leg WDL  (R) Heel/Foot/Toe WDL  (L) Heel/Foot/Toe WDL    Devices In Places: None      Interventions In Place N/A    Possible Skin Injury No    Pictures Uploaded Into Epic N/A  Wound Consult Placed N/A  RN Wound Prevention Protocol Ordered No

## 2021-12-21 NOTE — OR NURSING
1817 Procedure, patient allergies and NPO status verified. Home med rec completed and belongings secured. Patient verbalizes understanding of pain scale, expected course of stay and plan of care. SCD placed on bilateral calves. Triple aim completed by Niki VAUGHN.

## 2021-12-21 NOTE — CONSULTS
Surgical Consultation    Date: 12/20/2021    Requesting Physician: Dr. Dahl  PCP: No primary care provider on file.  Attending Physician: Berhane Arango M.D.    CC: Abdominal pain    HPI: This is a 81 y.o. female who is presenting with approximately 48 hours of worsening abdominal pain localizing to the right lower quadrant.  She presented to the emergency department earlier today where she was found to have leukocytosis and a CT consistent with acute uncomplicated appendicitis.  No current fever, chills, chest pain, shortness of breath, hematemesis, hemoptysis, hematochezia, melena, neurologic changes.    Past Medical History:   Diagnosis Date   • Bursitis     hip    • IBS (irritable bowel syndrome)    • Polycythemia rubra vera (HCC)        Past Surgical History:   Procedure Laterality Date   • GYN SURGERY      hysterectopy    • OTHER      bladder prolapse        Current Facility-Administered Medications   Medication Dose Route Frequency Provider Last Rate Last Admin   • [MAR Hold] rosuvastatin (CRESTOR) tablet 5 mg  5 mg Oral DAILY Hira Jacques D.O.       • [MAR Hold] senna-docusate (PERICOLACE or SENOKOT S) 8.6-50 MG per tablet 2 Tablet  2 Tablet Oral BID Hira Jacques D.O.        And   • [MAR Hold] polyethylene glycol/lytes (MIRALAX) PACKET 1 Packet  1 Packet Oral QDAY PRN Hira Jacques D.O.        And   • [MAR Hold] magnesium hydroxide (MILK OF MAGNESIA) suspension 30 mL  30 mL Oral QDAY PRN Hira Jacques D.O.        And   • [MAR Hold] bisacodyl (DULCOLAX) suppository 10 mg  10 mg Rectal QDAY PRN SWATHI Gonzalez.O.       • [MAR Hold] enoxaparin (LOVENOX) inj 40 mg  40 mg Subcutaneous DAILY JEREMIAS GonzalezO.       • [MAR Hold] acetaminophen (Tylenol) tablet 650 mg  650 mg Oral Q6HRS PRN SWATHI Gonzalez.O.       • [MAR Hold] labetalol (NORMODYNE/TRANDATE) injection 10 mg  10 mg Intravenous Q4HRS PRN SWATHI Gonzalez.O.       • [MAR Hold] ondansetron (ZOFRAN) syringe/vial injection 4 mg  4 mg  Intravenous Q4HRS PRN Hira Jacques D.O.       • [MAR Hold] ondansetron (ZOFRAN ODT) dispertab 4 mg  4 mg Oral Q4HRS PRN Hira Jacques D.O.       • [MAR Hold] Pharmacy Consult Request ...Pain Management Review 1 Each  1 Each Other PHARMACY TO DOSE Hira Jacques D.O.       • [MAR Hold] oxyCODONE immediate-release (ROXICODONE) tablet 2.5 mg  2.5 mg Oral Q3HRS PRN Hira Jacques D.O.        Or   • [MAR Hold] oxyCODONE immediate-release (ROXICODONE) tablet 5 mg  5 mg Oral Q3HRS PRN Hira Jacques D.O.        Or   • [MAR Hold] HYDROmorphone (Dilaudid) injection 0.25 mg  0.25 mg Intravenous Q3HRS PRN Hira Jacques D.O.       • lactated ringers infusion   Intravenous Continuous Hira Jacques D.O.       • [START ON 12/21/2021] cefTRIAXone (Rocephin) 1 g in  mL IVPB  1 g Intravenous Q24HRS Hira Jacques D.O.       • metroNIDAZOLE (FLAGYL) tablet 500 mg  500 mg Oral Q8HRS Hira Jacques D.O.           Social History     Socioeconomic History   • Marital status: Not on file     Spouse name: Not on file   • Number of children: Not on file   • Years of education: Not on file   • Highest education level: Not on file   Occupational History   • Not on file   Tobacco Use   • Smoking status: Never Smoker   • Smokeless tobacco: Never Used   Vaping Use   • Vaping Use: Never used   Substance and Sexual Activity   • Alcohol use: Never   • Drug use: Never   • Sexual activity: Not on file   Other Topics Concern   • Not on file   Social History Narrative   • Not on file     Social Determinants of Health     Financial Resource Strain:    • Difficulty of Paying Living Expenses: Not on file   Food Insecurity:    • Worried About Running Out of Food in the Last Year: Not on file   • Ran Out of Food in the Last Year: Not on file   Transportation Needs:    • Lack of Transportation (Medical): Not on file   • Lack of Transportation (Non-Medical): Not on file   Physical Activity:    • Days of Exercise per Week: Not on file   •  "Minutes of Exercise per Session: Not on file   Stress:    • Feeling of Stress : Not on file   Social Connections:    • Frequency of Communication with Friends and Family: Not on file   • Frequency of Social Gatherings with Friends and Family: Not on file   • Attends Mu-ism Services: Not on file   • Active Member of Clubs or Organizations: Not on file   • Attends Club or Organization Meetings: Not on file   • Marital Status: Not on file   Intimate Partner Violence:    • Fear of Current or Ex-Partner: Not on file   • Emotionally Abused: Not on file   • Physically Abused: Not on file   • Sexually Abused: Not on file   Housing Stability:    • Unable to Pay for Housing in the Last Year: Not on file   • Number of Places Lived in the Last Year: Not on file   • Unstable Housing in the Last Year: Not on file       History reviewed. No pertinent family history.    Allergies:  Doxycycline, Latex, Morphine, Other drug, and Penicillins    Review of Systems:  Negative except as noted above in the HPI and 10 point review    Physical Exam:  /76   Pulse 80   Temp 36.9 °C (98.5 °F) (Temporal)   Resp 18   Ht 1.626 m (5' 4\")   Wt 66.6 kg (146 lb 13.2 oz)   SpO2 95%     Constitutional: she is oriented to person, place, and time.  she appears well-developed and well-nourished. No acute distress.   Head: Normocephalic and atraumatic.   Neck: Normal range of motion. Neck supple. No JVD present. No tracheal deviation present.   Cardiovascular: Normal rate, regular rhythm  Pulmonary/Chest: Breathing is nonlabored on room air.  No stridor, no respiratory distress  Abdominal: Soft, tender to palpation the right lower quadrant with mild voluntary guarding  Musculoskeletal: Normal range of motion. she exhibits no edema and no tenderness.   Neurological: she is alert and oriented to person, place, and time.  No gross motor or sensory deficit  Skin: Skin is warm and dry. No rash noted. she is not diaphoretic. No erythema. No pallor. "   Psychiatric: she has a normal mood and affect.  Behavior is appropriate.       Labs:  Recent Labs     12/20/21  1544   WBC 18.2*   RBC 6.86*   HEMOGLOBIN 13.9   HEMATOCRIT 47.9*   MCV 69.8*   MCH 20.3*   MCHC 29.0*   RDW 51.7*   PLATELETCT 656*   MPV 10.7     Recent Labs     12/20/21  1544   SODIUM 140   POTASSIUM 4.2   CHLORIDE 103   CO2 24   GLUCOSE 94   BUN 15   CREATININE 0.77   CALCIUM 9.7         Recent Labs     12/20/21  1544   ASTSGOT 19   ALTSGPT 14   TBILIRUBIN 0.4   ALKPHOSPHAT 111*   GLOBULIN 4.3*       Radiology:  CT-ABDOMEN-PELVIS WITH   Final Result      1.  Acute appendicitis without evidence of perforation or intra-abdominal abscess.   2.  Cholelithiasis.   3.  Colonic diverticulosis.          Assessment: This is a 81 y.o. female with clinical signs and symptoms of acute uncomplicated appendicitis.  She is hemodynamically stable.      Recommendations:   -Recommend proceeding to the OR for laparoscopic appendectomy.  The operation was discussed along with the risks, which include but are not limited to bleeding, infection, damage to surrounding structures, need for further procedures, perioperative abscess, pneumonia, death.  The benefits and alternatives were also discussed and the patient wishes to proceed.      Berhane Arango M.D.  [facility]  [phone]

## 2021-12-21 NOTE — OR NURSING
"1938: Patient arrived from OR via bed.  Scope sites x3 CDI, approximated with dermabond. Ice pack provided.     Sedation/Resp Status: Drowsy, wakes to verbal.  Respirations spontaneous and non-labored.    HR 80s SR; VSS on 6L 02 via mask.    1945: Cont stable, no changes to scope sites/no nausea/pain. BP trending hypertensive, Dr Ryan aware, monitoring.     2000: Cont stable, denies pain/nausea. No changes to scope sites. Tolerating sips of clears. SBP normalizing.     2015: Cont stable, no changes. Reports slight RLQ discomfort - \"tolerable.\" Denies nausea. No changes to scope sites. ABD soft, tender. VSS on RA - Meets criteria for transfer to floor room.     2040: RN transport to floor room Placed 2L 02 by NC to keep Sats in 90s  - denies CP/SOB. 02 tank 319.   "

## 2021-12-21 NOTE — ANESTHESIA TIME REPORT
Anesthesia Start and Stop Event Times     Date Time Event    12/20/2021 1833 Ready for Procedure     1851 Anesthesia Start     1940 Anesthesia Stop        Responsible Staff  12/20/21    Name Role Begin End    Kevin Ryan M.D. Anesth 1851 1940        Preop Diagnosis (Free Text):  Pre-op Diagnosis     acute appendicitis        Preop Diagnosis (Codes):    Premium Reason  A. 3PM - 7AM    Comments:

## 2021-12-21 NOTE — PROGRESS NOTES
Discharging patient home per MD order. Pt demonstrated understanding of discharge instructions, follow up appointments, home medications, prescriptions, and home care for surgical wound. Pt is voiding without difficulty, pain well controlled, tolerating oral medications, O2 greater than 90%. Pt verbalized understanding of discharge instructions and educational handouts and all questions answered. PIV removed.  Pt discharged off unit with hospital escort.

## 2021-12-21 NOTE — ANESTHESIA PREPROCEDURE EVALUATION
Case: [id number] Date/Time: 12/20/21 1825    Procedure: APPENDECTOMY, LAPAROSCOPIC    Location: [facility] OR 01 / SURGERY [facility]    Surgeons: FELECIA Wilks    Relevant Problems   No relevant active problems       Physical Exam    Airway   Mallampati: II  TM distance: >3 FB  Neck ROM: full       Cardiovascular - normal exam  Rhythm: regular  Rate: normal  (-) murmur     Dental - normal exam           Pulmonary - normal exam  Breath sounds clear to auscultation     Abdominal    Neurological - normal exam                 Anesthesia Plan    ASA 2       Plan - general       Airway plan will be ETT          Induction: intravenous    Postoperative Plan: Postoperative administration of opioids is intended.    Pertinent diagnostic labs and testing reviewed    Informed Consent:    Anesthetic plan and risks discussed with patient.    Use of blood products discussed with: patient whom consented to blood products.

## 2021-12-21 NOTE — PROGRESS NOTES
Received report from PACU nurse  Assumed pt care  Pt is A&Ox4, resting comfortably in bed.   Pt on r.a.. No signs of SOB/respiratory distress.  Labs noted, VSS.   Pt stated that is tolerable and refused any pain meds upon assessment   Assessment completed; x3 lap sites to abd, buddy - CDI  Fall precautions in place.   Bed at lowest position.   Call light and personal belongings within reach.   Continue to monitor

## 2021-12-21 NOTE — ASSESSMENT & PLAN NOTE
Right lower quadrant abdominal pain x2 days  Acute appendicitis without evidence of perforation or intra-abdominal abscess.  WBC 18.2  Rocephin/flagyl  General surgery consulted

## 2021-12-21 NOTE — CARE PLAN
The patient is Stable - Low risk of patient condition declining or worsening    Shift Goals  Clinical Goals: manage surgical pain  Patient Goals: for discharge today    Progress made toward(s) clinical / shift goals:  Pain is well controlled, no nausea/vomiting noted.    Patient is not progressing towards the following goals:      Problem: Fall Risk  Goal: Patient will remain free from falls  Outcome: Progressing     Problem: Pain - Post Surgery  Goal: Alleviation or reduction of pain post surgery  Outcome: Progressing     Problem: Wound/ / Incision Healing  Goal: Patient's wound/surgical incision will decrease in size and heals properly  Outcome: Progressing     Problem: Discharge Barriers/Planning  Goal: Patient's continuum of care needs are met  Flowsheets (Taken 12/21/2021 0997)  Continuum of Care Needs:   Assessed for discharge barriers   Communicated discharge barriers to interdisciplinary tream   Involved patient/family/support system in discharge process   Provided and explained discharge instructions

## 2021-12-21 NOTE — DISCHARGE SUMMARY
Discharge Summary    CHIEF COMPLAINT ON ADMISSION  Chief Complaint   Patient presents with   • Abdominal Pain     since satruday RLQ       Reason for Admission  Abdominal Pain     Admission Date  12/20/2021    CODE STATUS  Full Code    HPI & HOSPITAL COURSE  This is a 81 years old female comes in with 48 hours of worsening abdominal pain in the right lower quadrant.  In the ER noted to have leukocytosis on lab work.  CT abdomen pelvis demonstrated acute uncomplicated appendicitis.  General surgery consulted.  Patient had laparoscopic appendectomy done on 12/20/2021.  Patient tolerated surgery well.  Pain was fairly controlled.  Started on oral diet which patient tolerated well.  Continue to be hemodynamically and clinically stable.  Febrile.  She was cleared for discharge from surgical medical standpoint.    Therefore, she is discharged in fair and stable condition to home with close outpatient follow-up.    The patient recovered much more quickly than anticipated on admission.    Discharge Date  12/21/2021    FOLLOW UP ITEMS POST DISCHARGE  PCP in 1 week     DISCHARGE DIAGNOSES  Principal Problem (Resolved):    Appendicitis POA: Yes  Active Problems:    Leukocytosis POA: Unknown    Thrombocytosis POA: Unknown    History of stroke POA: Unknown    Cholelithiasis POA: Unknown    Microcytosis POA: Unknown      FOLLOW UP  No future appointments.  Berhane Arango M.D.  [address]  [facility] [address]  [phone]    Schedule an appointment as soon as possible for a visit in 2 weeks        MEDICATIONS ON DISCHARGE     Medication List      START taking these medications      Instructions   cefdinir 300 MG Caps  Commonly known as: OMNICEF   Take 1 Capsule by mouth 2 times a day for 7 days.  Dose: 300 mg     metroNIDAZOLE 500 MG Tabs  Commonly known as: FLAGYL   Take 1 Tablet by mouth every 8 hours for 7 days.  Dose: 500 mg        CONTINUE taking these medications      Instructions   aspirin EC 81 MG  Tbec  Commonly known as: ECOTRIN   Take 162 mg by mouth every day.  Dose: 162 mg     D3 PO   Take 1 Capsule by mouth every day.  Dose: 1 Capsule     MAGNESIUM PO   Take 1 Capsule by mouth every evening.  Dose: 1 Capsule     rosuvastatin 5 MG Tabs  Commonly known as: CRESTOR   Take 5 mg by mouth every day.  Dose: 5 mg     TYLENOL PO   Take 2 Tablets by mouth 2 times a day as needed (For pain). 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   • Morphine Itching   • Other Drug Itching     Any medications that effect eyes    • Penicillins Nausea       DIET  Orders Placed This Encounter   Procedures   • Diet Order Diet: Regular     Standing Status:   Standing     Number of Occurrences:   1     Order Specific Question:   Diet:     Answer:   Regular [1]       ACTIVITY  As tolerated.  Weight bearing as tolerated    CONSULTATIONS  Gen surgery     PROCEDURES  As above     LABORATORY  Lab Results   Component Value Date    SODIUM 138 12/21/2021    POTASSIUM 4.4 12/21/2021    CHLORIDE 104 12/21/2021    CO2 22 12/21/2021    GLUCOSE 142 (H) 12/21/2021    BUN 11 12/21/2021    CREATININE 0.58 12/21/2021        Lab Results   Component Value Date    WBC 19.8 (H) 12/21/2021    HEMOGLOBIN 13.9 12/21/2021    HEMATOCRIT 47.7 (H) 12/21/2021    PLATELETCT 667 (H) 12/21/2021        Total time of the discharge process exceeds 34 minutes.

## 2021-12-21 NOTE — OP REPORT
Operative Report    Date: 12/20/2021    PreOp Diagnosis:   1.  Acute appendicitis    PostOp Diagnosis: Same    Procedure(s):  1.  APPENDECTOMY, LAPAROSCOPIC - Wound Class: Contaminated    Surgeon(s):  Berhane Arango M.D.    Anesthesiologist/Type of Anesthesia:  Anesthesiologist: Kevin Ryan M.D./General    Surgical Staff:  Circulator: Aleena Linder  Scrub Person: Cha Wagoner    Specimens removed if any:  ID Type Source Tests Collected by Time Destination   A :  Tissue Appendix PATHOLOGY SPECIMEN Berhane Arango M.D. 12/20/2021  7:22 PM        Estimated Blood Loss: Minimal    Findings: Inflamed, dilated appendix without evidence of perforation    Complications: None noted    Outcome: Transferred to PACU in stable condition    Indications:  81-year-old female presenting with clinical signs and symptoms of acute uncomplicated appendicitis for which the procedure above is recommended.  This was discussed with her in detail include the risk, benefits, alternatives, she wished to proceed.    Procedure in detail: The patient was identified in the pre-operative holding area and brought to the operating room. Correct side and site were identified. Pre-operative antibiotics were administered prior to the procedure. GETA was smoothly induced. The patient was prepped and draped in the usual sterile fashion.     After infiltration of local anesthetic, an incision was made inferior to the umbilicus to accomodate a 5 mm trocar. The veress needle was placed intraperitoneally, and sterile saline drop test was performed. The abdomen was insufflated to 15 mmHg, which the patient tolerated well, with initially low insufflation pressures. A 5 mm trocar was placed, and a 5 mm 30 degree lapararoscope was used to survey the abdomen. No evidence of intraperitoneal trauma from Veress or trocar placement was found.    I then placed, under video assistance, a 5 mm trocar in the suprapubic area, and a 12 mm trocar in the left lower  quadrant. I visualized the cecum and found the appendix, it was dilated, inflamed, but without evidence of perforation. The appendix was elevated with an atraumatic grasper, a window was created in its mesentery with a [place] dissector.  The base of the appendix was divided with a blue load of the endoGIA stapler, and the appendiceal mesentery was divided with the Ligasure device.  The appendix was placed in an endocatch bag and removed from the 12 mm port site. Copious irrigation was performed. The appendiceal artery and base were hemostatic.    All ports were removed with video assist, and were hemostatic. The 12 mm port site fascia was closed with 0 vicryl suture. All skin sites were closed with subcuticular sutre and Dermabond    The patient was awakened from general anesthetic, and was taken to the recovery room in stable condition.    Sponge and needle counts were correct at the end of the case.       Berhane Arango M.D.  [facility]  [phone]    12/20/2021 7:32 PM Berhane Arango M.D.

## 2021-12-21 NOTE — DISCHARGE INSTRUCTIONS
Discharge Instructions    Discharged to home by car with relative. Discharged via wheelchair, hospital escort: Yes.  Special equipment needed: Not Applicable    Be sure to schedule a follow-up appointment with your primary care doctor or any specialists as instructed.     Discharge Plan:   Diet Plan: Discussed  Activity Level: Discussed  Confirmed Follow up Appointment: Patient to Call and Schedule Appointment  Confirmed Symptoms Management: Discussed  Medication Reconciliation Updated: No (Comments)  Influenza Vaccine Indication: Not indicated: Previously immunized this influenza season and > 8 years of age    I understand that a diet low in cholesterol, fat, and sodium is recommended for good health. Unless I have been given specific instructions below for another diet, I accept this instruction as my diet prescription.   Other diet: Regular    Special Instructions: None    · Is patient discharged on Warfarin / Coumadin?   No     Depression / Suicide Risk    As you are discharged from this [facility] facility, it is important to learn how to keep safe from harming yourself.    Recognize the warning signs:  · Abrupt changes in personality, positive or negative- including increase in energy   · Giving away possessions  · Change in eating patterns- significant weight changes-  positive or negative  · Change in sleeping patterns- unable to sleep or sleeping all the time   · Unwillingness or inability to communicate  · Depression  · Unusual sadness, discouragement and loneliness  · Talk of wanting to die  · Neglect of personal appearance   · Rebelliousness- reckless behavior  · Withdrawal from people/activities they love  · Confusion- inability to concentrate     If you or a loved one observes any of these behaviors or has concerns about self-harm, here's what you can do:  · Talk about it- your feelings and reasons for harming yourself  · Remove any means that you might use to hurt yourself (examples: pills, rope,  extension cords, firearm)  · Get professional help from the community (Mental Health, Substance Abuse, psychological counseling)  · Do not be alone:Call your Safe Contact- someone whom you trust who will be there for you.  · Call your local CRISIS HOTLINE 134-8444 or 610-977-1609  · Call your local Children's Mobile Crisis Response Team [place] (266) 106-6774 or www.ShopAdvisor  · Call the toll free National Suicide Prevention Hotlines   · National Suicide Prevention Lifeline 661-511-MVEU (9038)  · [employer] [facility] Line Network 800-SUICIDE (279-6732)    Laparoscopic Appendectomy, Adult, Care After  This sheet gives you information about how to care for yourself after your procedure. Your doctor may also give you more specific instructions. If you have problems or questions, contact your doctor.  What can I expect after the procedure?  After the procedure, it is common to have:  · Little energy for normal activities.  · Mild pain in the area where the cuts from surgery (incisions) were made.  · Trouble pooping (constipation). This can be caused by:  ? Pain medicine.  ? A lack of activity.  Follow these instructions at home:  Medicines  · Take over-the-counter and prescription medicines only as told by your doctor.  · If you were prescribed an antibiotic medicine, take it as told by your doctor. Do not stop taking it even if you start to feel better.  · Do not drive or use heavy machinery while taking prescription pain medicine.  · Ask your doctor if the medicine you are taking can cause trouble pooping. You may need to take steps to prevent or treat trouble pooping:  ? Drink enough fluid to keep your pee (urine) pale yellow.  ? Take over-the-counter or prescription medicines.  ? Eat foods that are high in fiber. These include beans, whole grains, and fresh fruits and vegetables.  ? Limit foods that are high in fat and sugar. These include fried or sweet foods.  Incision care    · Follow instructions from your  doctor about how to take care of your cuts from surgery. Make sure you:  ? Wash your hands with soap and water before and after you change your bandage (dressing). If you cannot use soap and water, use hand .  ? Change your bandage as told by your doctor.  ? Leave stitches (sutures), skin glue, or skin tape (adhesive) strips in place. They may need to stay in place for 2 weeks or longer. If tape strips get loose and curl up, you may trim the loose edges. Do not remove tape strips completely unless your doctor says it is okay.  · Check your cuts from surgery every day for signs of infection. Check for:  ? Redness, swelling, or pain.  ? Fluid or blood.  ? Warmth.  ? Pus or a bad smell.  Bathing  · Keep your cuts from surgery clean and dry. Clean them as told by your doctor. To do this:  1. Gently wash the cuts with soap and water.  2. Rinse the cuts with water to remove all soap.  3. Pat the cuts dry with a clean towel. Do not rub the cuts.  · Do not take baths, swim, or use a hot tub for 2 weeks, or until your doctor says it is okay. You may take showers after 48 hours.  Activity    · Do not drive for 24 hours if you were given a medicine to help you relax (sedative) during your procedure.  · Rest after the procedure. Return to your normal activities as told by your doctor. Ask your doctor what activities are safe for you.  · For 3 weeks, or for as long as told by your doctor:  ? Do not lift anything that is heavier than 10 lb (4.5 kg), or the limit that you are told.  ? Do not play contact sports.  General instructions  · If you were sent home with a drain, follow instructions from your doctor on how to care for it.  · Take deep breaths. This helps to keep your lungs from getting an infection (pneumonia).  · Keep all follow-up visits as told by your doctor. This is important.  Contact a doctor if:  · You have redness, swelling, or pain around a cut from surgery.  · You have fluid or blood coming from a  cut.  · Your cut feels warm to the touch.  · You have pus or a bad smell coming from a cut or a bandage.  · The edges of a cut break open after the stitches have been taken out.  · You have pain in your shoulders that gets worse.  · You feel dizzy or you pass out (faint).  · You have shortness of breath.  · You keep feeling sick to your stomach (nauseous).  · You keep throwing up (vomiting).  · You get watery poop (diarrhea) or you cannot control your poop.  · You lose your appetite.  · You have swelling or pain in your legs.  · You get a rash.  Get help right away if:  · You have a fever.  · You have trouble breathing.  · You have sharp pains in your chest.  Summary  · After the procedure, it is common to have low energy, mild pain, and trouble pooping.  · Infection is a common problem after this procedure. Follow your doctor's instructions about caring for yourself after the procedure.  · Rest after the procedure. Return to your normal activities as told by your doctor.  · Contact your doctor if you see signs of infection around your cuts from surgery, or you get short of breath. Get help right away if you have a fever, chest pain, or trouble breathing.  This information is not intended to replace advice given to you by your health care provider. Make sure you discuss any questions you have with your health care provider.  Document Released: 10/14/2010 Document Revised: 06/20/2019 Document Reviewed: 06/20/2019  Elsevier Patient Education © 2020 [employer]

## 2023-01-02 ENCOUNTER — APPOINTMENT (OUTPATIENT)
Dept: CARDIOLOGY | Facility: MEDICAL CENTER | Age: 83
DRG: 308 | End: 2023-01-02
Attending: STUDENT IN AN ORGANIZED HEALTH CARE EDUCATION/TRAINING PROGRAM
Payer: MEDICARE

## 2023-01-02 ENCOUNTER — HOSPITAL ENCOUNTER (INPATIENT)
Facility: MEDICAL CENTER | Age: 83
LOS: 2 days | DRG: 308 | End: 2023-01-06
Attending: EMERGENCY MEDICINE | Admitting: STUDENT IN AN ORGANIZED HEALTH CARE EDUCATION/TRAINING PROGRAM
Payer: MEDICARE

## 2023-01-02 ENCOUNTER — APPOINTMENT (OUTPATIENT)
Dept: RADIOLOGY | Facility: MEDICAL CENTER | Age: 83
DRG: 308 | End: 2023-01-02
Attending: EMERGENCY MEDICINE
Payer: MEDICARE

## 2023-01-02 DIAGNOSIS — I48.91 ATRIAL FIBRILLATION WITH RVR (HCC): ICD-10-CM

## 2023-01-02 DIAGNOSIS — U07.1 COVID-19 VIRUS INFECTION: ICD-10-CM

## 2023-01-02 DIAGNOSIS — I50.21 ACUTE SYSTOLIC HEART FAILURE (HCC): ICD-10-CM

## 2023-01-02 PROBLEM — R65.10 SIRS (SYSTEMIC INFLAMMATORY RESPONSE SYNDROME) (HCC): Status: ACTIVE | Noted: 2023-01-02

## 2023-01-02 PROBLEM — R79.89 ELEVATED BRAIN NATRIURETIC PEPTIDE (BNP) LEVEL: Status: ACTIVE | Noted: 2023-01-02

## 2023-01-02 PROBLEM — D45 POLYCYTHEMIA VERA (HCC): Status: ACTIVE | Noted: 2023-01-02

## 2023-01-02 LAB
ALBUMIN SERPL BCP-MCNC: 3.1 G/DL (ref 3.2–4.9)
ALBUMIN/GLOB SERPL: 0.7 G/DL
ALP SERPL-CCNC: 102 U/L (ref 30–99)
ALT SERPL-CCNC: 12 U/L (ref 2–50)
ANION GAP SERPL CALC-SCNC: 11 MMOL/L (ref 7–16)
ANISOCYTOSIS BLD QL SMEAR: ABNORMAL
AST SERPL-CCNC: 18 U/L (ref 12–45)
BASOPHILS # BLD AUTO: 0.8 % (ref 0–1.8)
BASOPHILS # BLD: 0.14 K/UL (ref 0–0.12)
BILIRUB SERPL-MCNC: 0.4 MG/DL (ref 0.1–1.5)
BUN SERPL-MCNC: 18 MG/DL (ref 8–22)
CALCIUM ALBUM COR SERPL-MCNC: 9.8 MG/DL (ref 8.5–10.5)
CALCIUM SERPL-MCNC: 9.1 MG/DL (ref 8.5–10.5)
CHLORIDE SERPL-SCNC: 104 MMOL/L (ref 96–112)
CO2 SERPL-SCNC: 23 MMOL/L (ref 20–33)
COMMENT 1642: NORMAL
CREAT SERPL-MCNC: 0.47 MG/DL (ref 0.5–1.4)
EKG IMPRESSION: NORMAL
EOSINOPHIL # BLD AUTO: 0.51 K/UL (ref 0–0.51)
EOSINOPHIL NFR BLD: 3.1 % (ref 0–6.9)
ERYTHROCYTE [DISTWIDTH] IN BLOOD BY AUTOMATED COUNT: 49.5 FL (ref 35.9–50)
GFR SERPLBLD CREATININE-BSD FMLA CKD-EPI: 96 ML/MIN/1.73 M 2
GLOBULIN SER CALC-MCNC: 4.4 G/DL (ref 1.9–3.5)
GLUCOSE SERPL-MCNC: 103 MG/DL (ref 65–99)
HCT VFR BLD AUTO: 39.7 % (ref 37–47)
HGB BLD-MCNC: 11.5 G/DL (ref 12–16)
HYPOCHROMIA BLD QL SMEAR: ABNORMAL
IMM GRANULOCYTES # BLD AUTO: 0.11 K/UL (ref 0–0.11)
IMM GRANULOCYTES NFR BLD AUTO: 0.7 % (ref 0–0.9)
LYMPHOCYTES # BLD AUTO: 2.11 K/UL (ref 1–4.8)
LYMPHOCYTES NFR BLD: 12.7 % (ref 22–41)
MCH RBC QN AUTO: 19.2 PG (ref 27–33)
MCHC RBC AUTO-ENTMCNC: 29 G/DL (ref 33.6–35)
MCV RBC AUTO: 66.4 FL (ref 81.4–97.8)
MICROCYTES BLD QL SMEAR: ABNORMAL
MONOCYTES # BLD AUTO: 1.2 K/UL (ref 0–0.85)
MONOCYTES NFR BLD AUTO: 7.2 % (ref 0–13.4)
MORPHOLOGY BLD-IMP: NORMAL
NEUTROPHILS # BLD AUTO: 12.52 K/UL (ref 2–7.15)
NEUTROPHILS NFR BLD: 75.5 % (ref 44–72)
NRBC # BLD AUTO: 0 K/UL
NRBC BLD-RTO: 0 /100 WBC
NT-PROBNP SERPL IA-MCNC: 4703 PG/ML (ref 0–125)
PLATELET # BLD AUTO: 751 K/UL (ref 164–446)
PLATELET BLD QL SMEAR: NORMAL
PMV BLD AUTO: 10.3 FL (ref 9–12.9)
POTASSIUM SERPL-SCNC: 3.6 MMOL/L (ref 3.6–5.5)
PROCALCITONIN SERPL-MCNC: 0.13 NG/ML
PROT SERPL-MCNC: 7.5 G/DL (ref 6–8.2)
RBC # BLD AUTO: 5.98 M/UL (ref 4.2–5.4)
RBC BLD AUTO: PRESENT
SODIUM SERPL-SCNC: 138 MMOL/L (ref 135–145)
T4 FREE SERPL-MCNC: 1.64 NG/DL (ref 0.93–1.7)
TROPONIN T SERPL-MCNC: 17 NG/L (ref 6–19)
TSH SERPL DL<=0.005 MIU/L-ACNC: 1.08 UIU/ML (ref 0.38–5.33)
WBC # BLD AUTO: 16.6 K/UL (ref 4.8–10.8)

## 2023-01-02 PROCEDURE — 700111 HCHG RX REV CODE 636 W/ 250 OVERRIDE (IP): Performed by: EMERGENCY MEDICINE

## 2023-01-02 PROCEDURE — 93306 TTE W/DOPPLER COMPLETE: CPT

## 2023-01-02 PROCEDURE — G0378 HOSPITAL OBSERVATION PER HR: HCPCS

## 2023-01-02 PROCEDURE — 96376 TX/PRO/DX INJ SAME DRUG ADON: CPT

## 2023-01-02 PROCEDURE — 700105 HCHG RX REV CODE 258: Performed by: EMERGENCY MEDICINE

## 2023-01-02 PROCEDURE — 93005 ELECTROCARDIOGRAM TRACING: CPT | Performed by: EMERGENCY MEDICINE

## 2023-01-02 PROCEDURE — 71045 X-RAY EXAM CHEST 1 VIEW: CPT

## 2023-01-02 PROCEDURE — 94760 N-INVAS EAR/PLS OXIMETRY 1: CPT

## 2023-01-02 PROCEDURE — 93005 ELECTROCARDIOGRAM TRACING: CPT

## 2023-01-02 PROCEDURE — 99285 EMERGENCY DEPT VISIT HI MDM: CPT

## 2023-01-02 PROCEDURE — 84484 ASSAY OF TROPONIN QUANT: CPT

## 2023-01-02 PROCEDURE — 84145 PROCALCITONIN (PCT): CPT

## 2023-01-02 PROCEDURE — 99223 1ST HOSP IP/OBS HIGH 75: CPT | Mod: GC | Performed by: STUDENT IN AN ORGANIZED HEALTH CARE EDUCATION/TRAINING PROGRAM

## 2023-01-02 PROCEDURE — 84443 ASSAY THYROID STIM HORMONE: CPT

## 2023-01-02 PROCEDURE — 83880 ASSAY OF NATRIURETIC PEPTIDE: CPT

## 2023-01-02 PROCEDURE — 96375 TX/PRO/DX INJ NEW DRUG ADDON: CPT

## 2023-01-02 PROCEDURE — 85025 COMPLETE CBC W/AUTO DIFF WBC: CPT

## 2023-01-02 PROCEDURE — A9270 NON-COVERED ITEM OR SERVICE: HCPCS | Performed by: EMERGENCY MEDICINE

## 2023-01-02 PROCEDURE — 80053 COMPREHEN METABOLIC PANEL: CPT

## 2023-01-02 PROCEDURE — 36415 COLL VENOUS BLD VENIPUNCTURE: CPT

## 2023-01-02 PROCEDURE — 84439 ASSAY OF FREE THYROXINE: CPT

## 2023-01-02 PROCEDURE — 96374 THER/PROPH/DIAG INJ IV PUSH: CPT

## 2023-01-02 PROCEDURE — 700102 HCHG RX REV CODE 250 W/ 637 OVERRIDE(OP): Performed by: EMERGENCY MEDICINE

## 2023-01-02 RX ORDER — METOPROLOL TARTRATE 1 MG/ML
5 INJECTION, SOLUTION INTRAVENOUS
Status: DISCONTINUED | OUTPATIENT
Start: 2023-01-02 | End: 2023-01-06 | Stop reason: HOSPADM

## 2023-01-02 RX ORDER — POLYETHYLENE GLYCOL 3350 17 G/17G
1 POWDER, FOR SOLUTION ORAL
Status: DISCONTINUED | OUTPATIENT
Start: 2023-01-02 | End: 2023-01-06 | Stop reason: HOSPADM

## 2023-01-02 RX ORDER — ACETAMINOPHEN 500 MG
1000 TABLET ORAL
COMMUNITY

## 2023-01-02 RX ORDER — ACETAMINOPHEN 325 MG/1
650 TABLET ORAL EVERY 6 HOURS PRN
Status: DISCONTINUED | OUTPATIENT
Start: 2023-01-02 | End: 2023-01-06 | Stop reason: HOSPADM

## 2023-01-02 RX ORDER — BISACODYL 10 MG
10 SUPPOSITORY, RECTAL RECTAL
Status: DISCONTINUED | OUTPATIENT
Start: 2023-01-02 | End: 2023-01-06 | Stop reason: HOSPADM

## 2023-01-02 RX ORDER — METOPROLOL TARTRATE 50 MG/1
50 TABLET, FILM COATED ORAL ONCE
Status: COMPLETED | OUTPATIENT
Start: 2023-01-02 | End: 2023-01-02

## 2023-01-02 RX ORDER — SODIUM CHLORIDE 9 MG/ML
1000 INJECTION, SOLUTION INTRAVENOUS ONCE
Status: COMPLETED | OUTPATIENT
Start: 2023-01-02 | End: 2023-01-02

## 2023-01-02 RX ORDER — FAMOTIDINE 10 MG
10 TABLET ORAL NIGHTLY
Status: ON HOLD | COMMUNITY
End: 2023-01-06

## 2023-01-02 RX ORDER — FUROSEMIDE 10 MG/ML
40 INJECTION INTRAMUSCULAR; INTRAVENOUS ONCE
Status: DISCONTINUED | OUTPATIENT
Start: 2023-01-02 | End: 2023-01-02

## 2023-01-02 RX ORDER — AZITHROMYCIN 250 MG/1
250 TABLET, FILM COATED ORAL SEE ADMIN INSTRUCTIONS
Status: ON HOLD | COMMUNITY
Start: 2022-12-28 | End: 2023-01-06

## 2023-01-02 RX ORDER — FUROSEMIDE 10 MG/ML
40 INJECTION INTRAMUSCULAR; INTRAVENOUS
Status: DISCONTINUED | OUTPATIENT
Start: 2023-01-03 | End: 2023-01-03

## 2023-01-02 RX ORDER — AMOXICILLIN 250 MG
2 CAPSULE ORAL 2 TIMES DAILY
Status: DISCONTINUED | OUTPATIENT
Start: 2023-01-03 | End: 2023-01-06 | Stop reason: HOSPADM

## 2023-01-02 RX ORDER — DILTIAZEM HYDROCHLORIDE 5 MG/ML
0.25 INJECTION INTRAVENOUS ONCE
Status: COMPLETED | OUTPATIENT
Start: 2023-01-02 | End: 2023-01-02

## 2023-01-02 RX ORDER — ROSUVASTATIN CALCIUM 10 MG/1
5 TABLET, COATED ORAL DAILY
Status: DISCONTINUED | OUTPATIENT
Start: 2023-01-03 | End: 2023-01-06 | Stop reason: HOSPADM

## 2023-01-02 RX ORDER — FUROSEMIDE 10 MG/ML
40 INJECTION INTRAMUSCULAR; INTRAVENOUS ONCE
Status: COMPLETED | OUTPATIENT
Start: 2023-01-02 | End: 2023-01-02

## 2023-01-02 RX ORDER — DILTIAZEM HYDROCHLORIDE 5 MG/ML
10 INJECTION INTRAVENOUS ONCE
Status: COMPLETED | OUTPATIENT
Start: 2023-01-02 | End: 2023-01-02

## 2023-01-02 RX ORDER — OMEPRAZOLE 20 MG/1
20 CAPSULE, DELAYED RELEASE ORAL
Status: DISCONTINUED | OUTPATIENT
Start: 2023-01-03 | End: 2023-01-06 | Stop reason: HOSPADM

## 2023-01-02 RX ADMIN — DILTIAZEM HYDROCHLORIDE 15.9 MG: 5 INJECTION INTRAVENOUS at 17:06

## 2023-01-02 RX ADMIN — FUROSEMIDE 40 MG: 10 INJECTION, SOLUTION INTRAMUSCULAR; INTRAVENOUS at 18:49

## 2023-01-02 RX ADMIN — APIXABAN 5 MG: 5 TABLET, FILM COATED ORAL at 19:25

## 2023-01-02 RX ADMIN — SODIUM CHLORIDE 1000 ML: 9 INJECTION, SOLUTION INTRAVENOUS at 17:04

## 2023-01-02 RX ADMIN — METOPROLOL TARTRATE 50 MG: 50 TABLET, FILM COATED ORAL at 19:25

## 2023-01-02 RX ADMIN — DILTIAZEM HYDROCHLORIDE 10 MG: 5 INJECTION INTRAVENOUS at 19:26

## 2023-01-02 ASSESSMENT — LIFESTYLE VARIABLES
TOTAL SCORE: 0
DOES PATIENT WANT TO STOP DRINKING: NO
EVER HAD A DRINK FIRST THING IN THE MORNING TO STEADY YOUR NERVES TO GET RID OF A HANGOVER: NO
TOTAL SCORE: 0
AVERAGE NUMBER OF DAYS PER WEEK YOU HAVE A DRINK CONTAINING ALCOHOL: 0
HAVE PEOPLE ANNOYED YOU BY CRITICIZING YOUR DRINKING: NO
ON A TYPICAL DAY WHEN YOU DRINK ALCOHOL HOW MANY DRINKS DO YOU HAVE: 0
CONSUMPTION TOTAL: NEGATIVE
HAVE YOU EVER FELT YOU SHOULD CUT DOWN ON YOUR DRINKING: NO
EVER FELT BAD OR GUILTY ABOUT YOUR DRINKING: NO
ALCOHOL_USE: NO
TOTAL SCORE: 0
HOW MANY TIMES IN THE PAST YEAR HAVE YOU HAD 5 OR MORE DRINKS IN A DAY: 0

## 2023-01-02 ASSESSMENT — ENCOUNTER SYMPTOMS
COUGH: 0
HEARTBURN: 0
DOUBLE VISION: 0
SPUTUM PRODUCTION: 0
FEVER: 0
PALPITATIONS: 0
NAUSEA: 0
DIZZINESS: 0
CHILLS: 0
BLURRED VISION: 0
HEADACHES: 0
WHEEZING: 0
SHORTNESS OF BREATH: 1

## 2023-01-02 ASSESSMENT — CHA2DS2 SCORE
AGE 65 TO 74: NO
DIABETES: NO
CHA2DS2 VASC SCORE: 4
VASCULAR DISEASE: NO
AGE 75 OR GREATER: YES
SEX: FEMALE
PRIOR STROKE OR TIA OR THROMBOEMBOLISM: NO
HYPERTENSION: YES
DIABETES: NO
AGE 75 OR GREATER: YES
AGE 65 TO 74: NO
PRIOR STROKE OR TIA OR THROMBOEMBOLISM: YES
SEX: FEMALE
CHA2DS2 VASC SCORE: 6
VASCULAR DISEASE: NO
HYPERTENSION: YES
CHF OR LEFT VENTRICULAR DYSFUNCTION: NO
CHF OR LEFT VENTRICULAR DYSFUNCTION: NO

## 2023-01-02 ASSESSMENT — FIBROSIS 4 INDEX
FIB4 SCORE: 0.55
FIB4 SCORE: 0.52

## 2023-01-03 ENCOUNTER — APPOINTMENT (OUTPATIENT)
Dept: CARDIOLOGY | Facility: MEDICAL CENTER | Age: 83
DRG: 308 | End: 2023-01-03
Attending: NURSE PRACTITIONER
Payer: MEDICARE

## 2023-01-03 PROBLEM — I50.21 ACUTE SYSTOLIC HEART FAILURE (HCC): Status: ACTIVE | Noted: 2023-01-03

## 2023-01-03 LAB
ALBUMIN SERPL BCP-MCNC: 3.1 G/DL (ref 3.2–4.9)
ALBUMIN/GLOB SERPL: 0.7 G/DL
ALP SERPL-CCNC: 104 U/L (ref 30–99)
ALT SERPL-CCNC: 11 U/L (ref 2–50)
ANION GAP SERPL CALC-SCNC: 12 MMOL/L (ref 7–16)
AST SERPL-CCNC: 11 U/L (ref 12–45)
BASOPHILS # BLD AUTO: 1.2 % (ref 0–1.8)
BASOPHILS # BLD: 0.17 K/UL (ref 0–0.12)
BILIRUB SERPL-MCNC: 0.6 MG/DL (ref 0.1–1.5)
BUN SERPL-MCNC: 12 MG/DL (ref 8–22)
CALCIUM ALBUM COR SERPL-MCNC: 9.5 MG/DL (ref 8.5–10.5)
CALCIUM SERPL-MCNC: 8.8 MG/DL (ref 8.5–10.5)
CHLORIDE SERPL-SCNC: 103 MMOL/L (ref 96–112)
CO2 SERPL-SCNC: 23 MMOL/L (ref 20–33)
CREAT SERPL-MCNC: 0.51 MG/DL (ref 0.5–1.4)
D DIMER PPP IA.FEU-MCNC: 1.23 UG/ML (FEU) (ref 0–0.5)
EOSINOPHIL # BLD AUTO: 0.76 K/UL (ref 0–0.51)
EOSINOPHIL NFR BLD: 5.3 % (ref 0–6.9)
ERYTHROCYTE [DISTWIDTH] IN BLOOD BY AUTOMATED COUNT: 49 FL (ref 35.9–50)
GFR SERPLBLD CREATININE-BSD FMLA CKD-EPI: 94 ML/MIN/1.73 M 2
GLOBULIN SER CALC-MCNC: 4.4 G/DL (ref 1.9–3.5)
GLUCOSE SERPL-MCNC: 99 MG/DL (ref 65–99)
HCT VFR BLD AUTO: 42.1 % (ref 37–47)
HGB BLD-MCNC: 12 G/DL (ref 12–16)
IMM GRANULOCYTES # BLD AUTO: 0.1 K/UL (ref 0–0.11)
IMM GRANULOCYTES NFR BLD AUTO: 0.7 % (ref 0–0.9)
LV EJECT FRACT  99904: 35
LV EJECT FRACT MOD 2C 99903: 44.33
LV EJECT FRACT MOD 4C 99902: 27.32
LV EJECT FRACT MOD BP 99901: 36.3
LYMPHOCYTES # BLD AUTO: 2.79 K/UL (ref 1–4.8)
LYMPHOCYTES NFR BLD: 19.6 % (ref 22–41)
MAGNESIUM SERPL-MCNC: 1.7 MG/DL (ref 1.5–2.5)
MCH RBC QN AUTO: 18.9 PG (ref 27–33)
MCHC RBC AUTO-ENTMCNC: 28.5 G/DL (ref 33.6–35)
MCV RBC AUTO: 66.2 FL (ref 81.4–97.8)
MONOCYTES # BLD AUTO: 1.22 K/UL (ref 0–0.85)
MONOCYTES NFR BLD AUTO: 8.6 % (ref 0–13.4)
NEUTROPHILS # BLD AUTO: 9.17 K/UL (ref 2–7.15)
NEUTROPHILS NFR BLD: 64.6 % (ref 44–72)
NRBC # BLD AUTO: 0 K/UL
NRBC BLD-RTO: 0 /100 WBC
PLATELET # BLD AUTO: 848 K/UL (ref 164–446)
PMV BLD AUTO: 11.2 FL (ref 9–12.9)
POTASSIUM SERPL-SCNC: 3.6 MMOL/L (ref 3.6–5.5)
PROT SERPL-MCNC: 7.5 G/DL (ref 6–8.2)
RBC # BLD AUTO: 6.36 M/UL (ref 4.2–5.4)
SODIUM SERPL-SCNC: 138 MMOL/L (ref 135–145)
WBC # BLD AUTO: 14.2 K/UL (ref 4.8–10.8)

## 2023-01-03 PROCEDURE — A9270 NON-COVERED ITEM OR SERVICE: HCPCS | Performed by: INTERNAL MEDICINE

## 2023-01-03 PROCEDURE — 700111 HCHG RX REV CODE 636 W/ 250 OVERRIDE (IP): Performed by: STUDENT IN AN ORGANIZED HEALTH CARE EDUCATION/TRAINING PROGRAM

## 2023-01-03 PROCEDURE — A9270 NON-COVERED ITEM OR SERVICE: HCPCS | Performed by: STUDENT IN AN ORGANIZED HEALTH CARE EDUCATION/TRAINING PROGRAM

## 2023-01-03 PROCEDURE — 85025 COMPLETE CBC W/AUTO DIFF WBC: CPT

## 2023-01-03 PROCEDURE — 99205 OFFICE O/P NEW HI 60 MIN: CPT | Mod: FS | Performed by: INTERNAL MEDICINE

## 2023-01-03 PROCEDURE — 700102 HCHG RX REV CODE 250 W/ 637 OVERRIDE(OP): Performed by: INTERNAL MEDICINE

## 2023-01-03 PROCEDURE — 99232 SBSQ HOSP IP/OBS MODERATE 35: CPT | Performed by: NURSE PRACTITIONER

## 2023-01-03 PROCEDURE — 85379 FIBRIN DEGRADATION QUANT: CPT

## 2023-01-03 PROCEDURE — 700111 HCHG RX REV CODE 636 W/ 250 OVERRIDE (IP): Performed by: NURSE PRACTITIONER

## 2023-01-03 PROCEDURE — 93306 TTE W/DOPPLER COMPLETE: CPT | Mod: 26 | Performed by: INTERNAL MEDICINE

## 2023-01-03 PROCEDURE — G0378 HOSPITAL OBSERVATION PER HR: HCPCS

## 2023-01-03 PROCEDURE — 96376 TX/PRO/DX INJ SAME DRUG ADON: CPT

## 2023-01-03 PROCEDURE — 700102 HCHG RX REV CODE 250 W/ 637 OVERRIDE(OP): Performed by: STUDENT IN AN ORGANIZED HEALTH CARE EDUCATION/TRAINING PROGRAM

## 2023-01-03 PROCEDURE — 80053 COMPREHEN METABOLIC PANEL: CPT

## 2023-01-03 PROCEDURE — 93005 ELECTROCARDIOGRAM TRACING: CPT | Performed by: NURSE PRACTITIONER

## 2023-01-03 PROCEDURE — 83735 ASSAY OF MAGNESIUM: CPT

## 2023-01-03 RX ORDER — SPIRONOLACTONE 25 MG/1
25 TABLET ORAL
Status: DISCONTINUED | OUTPATIENT
Start: 2023-01-03 | End: 2023-01-05

## 2023-01-03 RX ORDER — FUROSEMIDE 10 MG/ML
20 INJECTION INTRAMUSCULAR; INTRAVENOUS ONCE
Status: COMPLETED | OUTPATIENT
Start: 2023-01-03 | End: 2023-01-03

## 2023-01-03 RX ORDER — METOPROLOL SUCCINATE 25 MG/1
25 TABLET, EXTENDED RELEASE ORAL 2 TIMES DAILY
Status: DISCONTINUED | OUTPATIENT
Start: 2023-01-03 | End: 2023-01-04

## 2023-01-03 RX ORDER — METOPROLOL SUCCINATE 25 MG/1
25 TABLET, EXTENDED RELEASE ORAL
Status: DISCONTINUED | OUTPATIENT
Start: 2023-01-04 | End: 2023-01-03

## 2023-01-03 RX ORDER — LOSARTAN POTASSIUM 25 MG/1
25 TABLET ORAL EVERY EVENING
Status: DISCONTINUED | OUTPATIENT
Start: 2023-01-03 | End: 2023-01-06 | Stop reason: HOSPADM

## 2023-01-03 RX ADMIN — SPIRONOLACTONE 25 MG: 25 TABLET ORAL at 15:57

## 2023-01-03 RX ADMIN — ROSUVASTATIN 5 MG: 10 TABLET, FILM COATED ORAL at 06:16

## 2023-01-03 RX ADMIN — LOSARTAN POTASSIUM 25 MG: 50 TABLET, FILM COATED ORAL at 18:16

## 2023-01-03 RX ADMIN — METOPROLOL TARTRATE 25 MG: 25 TABLET, FILM COATED ORAL at 06:17

## 2023-01-03 RX ADMIN — FUROSEMIDE 40 MG: 10 INJECTION, SOLUTION INTRAVENOUS at 06:17

## 2023-01-03 RX ADMIN — DOCUSATE SODIUM 50 MG AND SENNOSIDES 8.6 MG 2 TABLET: 8.6; 5 TABLET, FILM COATED ORAL at 06:16

## 2023-01-03 RX ADMIN — APIXABAN 5 MG: 5 TABLET, FILM COATED ORAL at 17:18

## 2023-01-03 RX ADMIN — APIXABAN 5 MG: 5 TABLET, FILM COATED ORAL at 06:17

## 2023-01-03 RX ADMIN — OMEPRAZOLE 20 MG: 20 CAPSULE, DELAYED RELEASE ORAL at 06:17

## 2023-01-03 RX ADMIN — FUROSEMIDE 20 MG: 10 INJECTION, SOLUTION INTRAVENOUS at 14:14

## 2023-01-03 RX ADMIN — METOPROLOL TARTRATE 25 MG: 25 TABLET, FILM COATED ORAL at 17:18

## 2023-01-03 ASSESSMENT — PAIN DESCRIPTION - PAIN TYPE: TYPE: ACUTE PAIN

## 2023-01-03 ASSESSMENT — ENCOUNTER SYMPTOMS
FEVER: 0
WHEEZING: 1
EYES NEGATIVE: 1
CHILLS: 0
MYALGIAS: 1
GASTROINTESTINAL NEGATIVE: 1
PSYCHIATRIC NEGATIVE: 1
SHORTNESS OF BREATH: 1
WEAKNESS: 1

## 2023-01-03 NOTE — H&P
[facility] Internal Medicine History & Physical Note    Date of Service  1/2/2023    [facility] Team: GET   Attending: Shamika Whitten M.d.  Senior Resident: Dr. Krishna  Contact Number: [phone]    Primary Care Physician  Pcp Not In Computer      Code Status  Full Code    Chief Complaint  Chief Complaint   Patient presents with    Rapid Heart Beat     New onset afib       History of Presenting Illness (HPI):   Rose Marie Romero is a 82 y.o. female with history of polycythemia vera who presented 1/2/2023 at the recommendation of urgent care in the setting of new onset A. fib with rapid ventricular rate.  Patient initially presented to urgent care for follow-up after having been ill due to COVID pneumonia and she experienced on December 23, 2022.  At that time she was discharged with Z-Joe and told to follow-up should her symptoms worsen or fail to resolve.  She presented today in setting of shortness of breath, and found to have new onset A. fib with RVR.    Evaluation of the emergency department revealed heart rate ranging from  with normal blood pressure.  Labs notable for elevated BNP of 4703, normal troponin, normal TSH.  Chest x-ray reviewed and noted to have left pleural effusion.    Patient received IV diltiazem 10 mg x 2 in addition to 50 mg of metoprolol.  Patient's heart rate did improve, still remains in atrial fibrillation.    I discussed the plan of care with patient, bedside RN, and my attending, Dr. Whitten .    Review of Systems  Review of Systems   Constitutional:  Negative for chills and fever.   HENT:  Negative for hearing loss and tinnitus.    Eyes:  Negative for blurred vision and double vision.   Respiratory:  Positive for shortness of breath. Negative for cough, sputum production and wheezing.    Cardiovascular:  Negative for chest pain and palpitations.   Gastrointestinal:  Negative for heartburn and nausea.   Genitourinary:  Negative for dysuria and urgency.   Neurological:  Negative for  dizziness and headaches.     Past Medical History   has a past medical history of Bursitis, IBS (irritable bowel syndrome), and Polycythemia rubra vera (HCC).    Surgical History   has a past surgical history that includes gyn surgery; other; and pr lap,appendectomy (N/A, 12/20/2021).     Family History  family history is not on file.   Family history reviewed with patient.     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    Morphine Itching    Other Drug Itching     Any medications that effect eyes     Penicillins Nausea       Medications  Prior to Admission Medications   Prescriptions Last Dose Informant Patient Reported? Taking?   Cholecalciferol (D3 PO) 1/2/2023 at AM Patient Yes No   Sig: Take 1 Tablet by mouth every day.   MAGNESIUM PO 1/2/2023 at AM Patient Yes No   Sig: Take 1 Tablet by mouth every morning.   acetaminophen (TYLENOL) 500 MG Tab 1/1/2023 at PM Patient Yes Yes   Sig: Take 1,000 mg by mouth 1 time a day as needed for Fever or Mild Pain. 1000 mg = 2 tablets   aspirin EC (ECOTRIN) 81 MG Tablet Delayed Response 1/2/2023 at AM Patient Yes No   Sig: Take 162 mg by mouth every day.   azithromycin (ZITHROMAX) 250 MG Tab 1/1/2023 at AM Patient Yes Yes   Sig: Take 250 mg by mouth see administration instructions. Take 2 tablets (500 mg) on day 1, then 1 tablet (250 mg) each day on days 2-5.   esomeprazole (NEXIUM) 20 MG capsule 1/2/2023 at AM Patient Yes Yes   Sig: Take 20 mg by mouth every morning before breakfast.   famotidine (PEPCID) 10 MG tablet 1/1/2023 at PM Patient Yes Yes   Sig: Take 10 mg by  mouth every evening.   rosuvastatin (CRESTOR) 5 MG Tab 1/2/2023 at AM Patient Yes No   Sig: Take 5 mg by mouth every day.      Facility-Administered Medications: None       Physical Exam  Temp:  [36.9 °C (98.5 °F)] 36.9 °C (98.5 °F)  Pulse:  [] 126  Resp:  [18-20] 18  BP: (131-146)/() 131/70  SpO2:  [93 %-94 %] 94 %  Blood Pressure : 131/70   Temperature: 36.9 °C (98.5 °F)   Pulse: (!) 126   Respiration: 18   Pulse Oximetry: 94 %       Physical Exam  Constitutional:       Appearance: Normal appearance.   HENT:      Head: Normocephalic and atraumatic.      Mouth/Throat:      Mouth: Mucous membranes are moist.      Pharynx: Oropharynx is clear.   Eyes:      Pupils: Pupils are equal, round, and reactive to light.   Cardiovascular:      Rate and Rhythm: Tachycardia present. Rhythm irregular.      Pulses: Normal pulses.      Heart sounds: Normal heart sounds. No murmur heard.  Pulmonary:      Effort: Pulmonary effort is normal.      Comments: Crackles noted on left lung base  Abdominal:      General: Abdomen is flat.      Palpations: Abdomen is soft.   Skin:     General: Skin is warm and dry.      Capillary Refill: Capillary refill takes less than 2 seconds.   Neurological:      General: No focal deficit present.      Mental Status: She is alert and oriented to person, place, and time.   Psychiatric:         Mood and Affect: Mood normal.         Behavior: Behavior normal.       Laboratory:  Recent Labs     01/02/23  1652   WBC 16.6*   RBC 5.98*   HEMOGLOBIN 11.5*   HEMATOCRIT 39.7   MCV 66.4*   MCH 19.2*   MCHC 29.0*   RDW 49.5   PLATELETCT 751*   MPV 10.3     Recent Labs     01/02/23  1652   SODIUM 138   POTASSIUM 3.6   CHLORIDE 104   CO2 23   GLUCOSE 103*   BUN 18   CREATININE 0.47*   CALCIUM 9.1     Recent Labs     01/02/23  1652   ALTSGPT 12   ASTSGOT 18   ALKPHOSPHAT 102*   TBILIRUBIN 0.4   GLUCOSE 103*         Recent Labs     01/02/23  1652   NTPROBNP 4703*         Recent Labs     01/02/23 1652    TROPONINT 17       Imaging:  DX-CHEST-PORTABLE (1 VIEW)   Final Result      Small left pleural effusion with atelectasis and/or consolidation.      EC-ECHOCARDIOGRAM COMPLETE W/O CONT    (Results Pending)       X-Ray:  I have personally reviewed the images and compared with prior images.  EKG:  I have personally reviewed the images and compared with prior images.    Assessment/Plan:  Problem Representation:     I anticipate this patient is appropriate for observation status at this time because new A. fib work-up    Patient will need a Telemetry bed on MEDICAL service .  The need is secondary to new A. fib work-up.    * Atrial fibrillation with rapid ventricular response (HCC)- (present on admission)  Assessment & Plan  Patient with new onset atrial fibrillation and rapid ventricular response.  [id number] 3.  Suspect A. fib likely secondary to combination of recent illness and senescence.  - Discussed therapeutic anticoagulation with patient, patient understands the risk benefits and alternatives and is amenable to initiation of therapeutic anticoagulation.  - Reinitiate patient on metoprolol tartrate 50 mg twice daily  - Echo ordered, will follow up results      Elevated brain natriuretic peptide (BNP) level  Assessment & Plan  Concern for possible underlying CHF given elevated BNP and pleural effusion on chest x-ray.  Received 1 dose of IV Lasix in ED  - Monitor I's and O's, daily weights  - Echo ordered, will f/u results  - Plan to initiate GDMT based on results of echo if necessary    Polycythemia vera ([facility])  Assessment & Plan  Patient with history of polycythemia vera, noted to have chronically elevated WBC, platelets as well  - Continue outpatient follow-up and monitoring      SIRS (systemic inflammatory response syndrome) ([facility])  Assessment & Plan  SIRS criteria identified on my evaluation include:  Tachycardia, with heart rate greater than 90 BPM and Leukocytosis, with WBC greater than 12,000  SIRS is  non-infectious (WBC chronically elevated in setting of PV, normal procal), the patient does not have sepsis          VTE prophylaxis: therapeutic anticoagulation with apixaban

## 2023-01-03 NOTE — ASSESSMENT & PLAN NOTE
SIRS criteria identified on my evaluation include:  Tachycardia, with heart rate greater than 90 BPM and Leukocytosis, with WBC greater than 12,000  SIRS is non-infectious (WBC chronically elevated in setting of PV, normal procal), the patient does not have sepsis

## 2023-01-03 NOTE — ED NOTES
Report given to Janine VAUGHN. Accepting nurse will be down to get pt to take pt to floor. Pt stable for transfer.

## 2023-01-03 NOTE — ED TRIAGE NOTES
Pt bib ems from .  Chief Complaint   Patient presents with    Rapid Heart Beat     New onset afib     Pt has been sick since 12/23. Covid+12/28. Felt better after z-pack, cough med and inh. Went to  today for f/u and was found to be in afib w/ RVR. Rate 115-140.     PTA . NS 600ml. ASA 162mg PO.

## 2023-01-03 NOTE — PROGRESS NOTES
4 Eyes Skin Assessment Completed by Janine RN and JOHANA Vivar.    Head WDL  Ears WDL  Nose WDL  Mouth WDL  Neck WDL  Breast/Chest WDL  Shoulder Blades WDL  Spine WDL  (R) Arm/Elbow/Hand WDL  (L) Arm/Elbow/Hand WDL  Abdomen WDL  Groin WDL  Scrotum/Coccyx/Buttocks WDL  (R) Leg WDL  (L) Leg WDL  (R) Heel/Foot/Toe WDL  (L) Heel/Foot/Toe WDL          Devices In Places Tele Box and Pulse Ox      Interventions In Place N/A    Possible Skin Injury No    Pictures Uploaded Into Epic N/A  Wound Consult Placed N/A  RN Wound Prevention Protocol Ordered No

## 2023-01-03 NOTE — ED NOTES
Med Rec Complete per patient  Allergies Reviewed with patient  Patient's Preferred [facility]acy: [facility] [address]      Patient recently completed a 5 day course of Azithromycin that was started on 12/28/22.

## 2023-01-03 NOTE — CARE PLAN
Problem: Knowledge Deficit - Standard  Goal: Patient and family/care givers will demonstrate understanding of plan of care, disease process/condition, diagnostic tests and medications  Outcome: Progressing   The patient is Stable - Low risk of patient condition declining or worsening    Shift Goals  Clinical Goals: hemodynamica stability  Patient Goals: sleep and comfort  Family Goals: n/a    Progress made toward(s) clinical / shift goals:  patient a+o x 4, denies chest pain/palpitations/lightheadedness/dizziness/sob/numbness/tingling, on monitor - afib in the 100's, up to bathroom with 1 assist - steady gait, no needs at this time, [facility]    Patient is not progressing towards the following goals: n/a    Fall precautions/hourly rounding maintained, call light within reach and functioning, all items within reach.  Patient encouraged to call for assistance, poc reviewed with patient, ?'s/concerns answered.   Bed alarm active

## 2023-01-03 NOTE — ASSESSMENT & PLAN NOTE
- Noted echocardiogram LVEF approximately 35%  -Patient exhibiting mild shortness of breath  -Easily gets fatigued during ambulation  -Requiring oxygen  -Ordered 1 dose of IV Lasix  -Cardiology consulted for new onset A. fib along with new onset HF  -Cardiology initiated HF medication including metoprolol, Aldactone, torsemide, losartan, continue Lasix

## 2023-01-03 NOTE — CARE PLAN
The patient is Stable - Low risk of patient condition declining or worsening    Shift Goals  Clinical Goals: tele monitor  Patient Goals: rest  Family Goals: not at bedside    Progress made toward(s) clinical / shift goals:    Problem: Knowledge Deficit - Standard  Goal: Patient and family/care givers will demonstrate understanding of plan of care, disease process/condition, diagnostic tests and medications  Outcome: Progressing       Patient is not progressing towards the following goals:

## 2023-01-03 NOTE — ED NOTES
[facility]    Progress Note    Tiffanie Obregon Patient Status:  Inpatient    1943 MRN [id number]   Location [facility] 2W/SW Attending Kleber Alvarez MD   Hosp Day # 2 PCP Fito Wolff MD       Subjective:   Tiffanie Obregon Report received from Soraida VAUGHN. Care assumed at this time.   Component Value Date    WBC 11.0 08/30/2019    HGB 14.0 08/30/2019    HCT 43.3 08/30/2019    .0 (L) 08/30/2019    CREATSERUM 5.33 (H) 08/30/2019    BUN 33 (H) 08/30/2019     (L) 08/30/2019    K 5.0 08/30/2019    CL 94 (L) 08/30/2019    CO2 2

## 2023-01-03 NOTE — ASSESSMENT & PLAN NOTE
Concern for possible underlying CHF given elevated BNP and pleural effusion on chest x-ray.  Received 1 dose of IV Lasix in ED  - Monitor I's and O's, daily weights  - Echo ordered, will f/u results  - Plan to initiate GDMT based on results of echo if necessary

## 2023-01-03 NOTE — ASSESSMENT & PLAN NOTE
Patient with new onset atrial fibrillation and rapid ventricular response.  [id number] 3.  Suspect A. fib likely secondary to combination of recent illness and senescence.  - Discussed therapeutic anticoagulation with patient, patient understands the risk benefits and alternatives and is amenable to initiation of therapeutic anticoagulation.  - Reinitiate patient on metoprolol tartrate 50 mg twice daily  - Echo ordered - noted LVEF 35%  --Consulted Cardiology  --Recommendations for cardioversion/MARC, but need to hold due to patient being COVID-positive  --Initiated amiodarone gtt. per cardiology; to switch to oral per cardiology

## 2023-01-03 NOTE — CONSULTS
Cardiology Consult Note:    JODIE Barragan  Date & Time note created:    1/3/2023   2:57 PM     Referring MD:  Alejo KING    Patient ID:   Name:             Rose Marie Romero   YOB: 1940  Age:                 82 y.o.  female   MRN:               [id number]                                                             Reason for Consult:      Atrial fibrillation and HFrEF    History of Present Illness:    82 years old female presented with SOB and cough. Presented to [facility] 12/28/2022 noted to have COVID pneumonia. Went back for follow up 1/2/2023 and noted to be in afib with RVR sent to ED.     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     Denies any tobacco, alcohol or any ilicit drugs. No pertinent family history noted.     She is currently visiting from [place].     ECHO showed ef 35%, global hypokinesis with regional variation, severely dilated left atrium. Pleural effusion. EKG showed afib. proBNP 4703.     Review of Systems:      Constitutional: Denies fevers, Denies weight changes  Eyes: Denies changes in vision, no eye pain  Ears/Nose/Throat/Mouth: Denies nasal congestion or sore throat   Cardiovascular: heaviness in the chest, denies palpitations   Respiratory: yes shortness of breath and positive cough  Gastrointestinal/Hepatic: Denies abdominal pain, nausea, vomiting, diarrhea, constipation or GI bleeding   Genitourinary: Denies dysuria or frequency  Musculoskeletal/Rheum: Denies  joint pain and swelling, no edema  Skin: Denies rash  Neurological: Denies headache, confusion, memory loss or focal weakness/parasthesias  Psychiatric: denies mood disorder   Endocrine: Abena thyroid problems  Heme/Oncology/Lymph Nodes: Denies enlarged lymph nodes, denies brusing or known bleeding disorder  All other systems were reviewed and are negative  (AMA/CMS criteria)                Past Medical History:   Past Medical History:   Diagnosis Date    Bursitis     hip     IBS (irritable bowel syndrome)     Polycythemia rubra vera (HCC)      Active Hospital Problems    Diagnosis     Acute systolic heart failure (HCC) [I50.21]     Atrial fibrillation with rapid ventricular response (HCC) [I48.91]     SIRS (systemic inflammatory response syndrome) (HCC) [R65.10]     Polycythemia vera (HCC) [D45]     Elevated brain natriuretic peptide (BNP) level [R79.89]        Past Surgical History:  Past Surgical History:   Procedure Laterality Date    SC LAP,APPENDECTOMY N/A 12/20/2021    Procedure: APPENDECTOMY, LAPAROSCOPIC;  Surgeon: Berhane Arango M.D.;  Location: SURGERY [facility];  Service: General    GYN SURGERY      hysterectopy     OTHER      bladder prolapse        Hospital Medications:    Current Facility-Administered Medications:     metoprolol tartrate (LOPRESSOR) tablet 25 mg, 25 mg, Oral, TWICE DAILY, Shamika Whitten M.D., 25 mg at 01/03/23 0617    spironolactone (ALDACTONE) tablet 25 mg, 25 mg, Oral, Q DAY, Russell Boyer M.D.    senna-docusate (PERICOLACE or SENOKOT S) 8.6-50 MG per tablet 2 Tablet, 2 Tablet, Oral, BID, 2 Tablet at 01/03/23 0616 **AND** polyethylene glycol/lytes (MIRALAX) PACKET 1 Packet, 1 Packet, Oral, QDAY PRN **AND** magnesium hydroxide (MILK OF MAGNESIA) suspension 30 mL, 30 mL, Oral, QDAY PRN **AND** bisacodyl (DULCOLAX) suppository 10 mg, 10 mg, Rectal, QDAY PRN, Brent Krishna M.D.    acetaminophen (Tylenol) tablet 650 mg, 650 mg, Oral, Q6HRS PRN, Brent Krishna M.D.    apixaban (ELIQUIS) tablet 5 mg, 5 mg, Oral, BID, Brent Krishna M.D., 5 mg at 01/03/23 0617    rosuvastatin (CRESTOR) tablet 5 mg, 5 mg, Oral, DAILY, Brent Krishna M.D., 5 mg at 01/03/23 0616    omeprazole (PRILOSEC) capsule 20 mg, 20 mg, Oral, [facility] RAFAEL, Brent Krishna M.D., 20 mg at 01/03/23 0617    Metoprolol Tartrate (LOPRESSOR) injection 5 mg, 5  mg, Intravenous, Q5 MIN PRN, Brent Krishna M.D.    furosemide (LASIX) injection 40 mg, 40 mg, Intravenous, Q DAY, Brent Krishna M.D., 40 mg at 01/03/23 0617    Current Outpatient Medications:  Medications Prior to Admission   Medication Sig Dispense Refill Last Dose    acetaminophen (TYLENOL) 500 MG Tab Take 1,000 mg by mouth 1 time a day as needed for Fever or Mild Pain. 1000 mg = 2 tablets   1/1/2023 at PM    esomeprazole (NEXIUM) 20 MG capsule Take 20 mg by mouth every morning before breakfast.   1/2/2023 at AM    famotidine (PEPCID) 10 MG tablet Take 10 mg by mouth every evening.   1/1/2023 at PM    azithromycin (ZITHROMAX) 250 MG Tab Take 250 mg by mouth see administration instructions. Take 2 tablets (500 mg) on day 1, then 1 tablet (250 mg) each day on days 2-5.   1/1/2023 at AM    aspirin EC (ECOTRIN) 81 MG Tablet Delayed Response Take 162 mg by mouth every day.   1/2/2023 at AM    Cholecalciferol (D3 PO) Take 1 Tablet by mouth every day.   1/2/2023 at AM    MAGNESIUM PO Take 1 Tablet by mouth every morning.   1/2/2023 at AM    rosuvastatin (CRESTOR) 5 MG Tab Take 5 mg by mouth every day.   1/2/2023 at AM       Medication Allergy:  Allergies   Allergen Reactions    Doxycycline Unspecified     Per pt went blind     Latex Rash and Itching     .    Morphine Itching    Other Drug Itching     Any medications that effect eyes     Penicillins Nausea       Family History:  History reviewed. No pertinent family history.    Social History:  Social History     Socioeconomic History    Marital status:      Spouse name: Not on file    Number of children: Not on file    Years of education: Not on file    Highest education level: Not on file   Occupational History    Not on file   Tobacco Use    Smoking status: Never    Smokeless tobacco: Never   Vaping Use    Vaping Use: Never used   Substance and Sexual Activity    Alcohol use: Never    Drug use: Never    Sexual activity: Not Currently   Other Topics  "Concern    Not on file   Social History Narrative    Not on file     Social Determinants of Health     Financial Resource Strain: Not on file   Food Insecurity: Not on file   Transportation Needs: Not on file   Physical Activity: Not on file   Stress: Not on file   Social Connections: Not on file   Intimate Partner Violence: Not on file   Housing Stability: Not on file         Physical Exam:  Vitals/ General Appearance:   Weight/BMI: Body mass index is 24.98 kg/m².  /65   Pulse (!) 101   Temp 37.1 °C (98.8 °F) (Temporal)   Resp 18   Ht 1.626 m (5' 4\")   Wt 66 kg (145 lb 8.1 oz)   SpO2 92%   Vitals:    01/03/23 0729 01/03/23 1132 01/03/23 1230 01/03/23 1231   BP: 130/86 119/65     Pulse: 98 (!) 101     Resp: 18 18     Temp: 36.7 °C (98.1 °F) 37.1 °C (98.8 °F)     TempSrc: Temporal Temporal     SpO2: 94% 91% (!) 85% 92%   Weight:       Height:         Oxygen Therapy:  Pulse Oximetry: 92 %, O2 (LPM): 1, O2 Delivery Device: Nasal Cannula (while sleeping)    Constitutional:   Well developed, Well nourished, No acute distress  HENMT:  Normocephalic, Atraumatic, Oropharynx moist mucous membranes, No oral exudates, Nose normal.  No thyromegaly.  Eyes:  EOMI, Conjunctiva normal, No discharge.  Neck:  Normal range of motion, No cervical tenderness,  no JVD.  Cardiovascular:  irregular irregular rhythm , No murmurs, No rubs, No gallops.   Extremitites with intact distal pulses, no cyanosis, or edema.  Lungs: congested   Abdomen: Bowel sounds normal, Soft, No tenderness, No guarding, No rebound, No masses, No hepatosplenomegaly.  Skin: Warm, Dry, No erythema, No rash, no induration.  Neurologic: Alert & oriented x 3,   Psychiatric: Affect normal, Judgment normal, Mood normal.      MDM (Data Review):     Records reviewed and summarized in current documentation    Lab Data Review:  Recent Results (from the past 24 hour(s))   EKG (NOW)    Collection Time: 01/02/23  4:16 PM   Result Value Ref Range    Report       " [facility].    Test Date:  2023  Pt Name:    TYRONE WALLIS               Department: ER  MRN:        [id number]                      Room:       RD 10  Gender:     Female                       Technician: [id number]  :        1940                   Requested By:ER TRIAGE PROTOCOL  Order #:    [id number]                    Reading MD: REHANA FORBES MD    Measurements  Intervals                                Axis  Rate:       116                          P:  SD:                                      QRS:        -4  QRSD:       93                           T:          -62  QT:         334  QTc:        465    Interpretive Statements  Atrial fibrillation  Left ventricular hypertrophy  Nonspecific T abnormalities, inferior leads  Compared to ECG 2021 18:41:27  Left ventricular hypertrophy now present  T-wave abnormality now present  Sinus rhythm no longer present  Poor R-wave progression no longer present  Electronically Signed On 2023 19:09 :59 PST by REHANA FORBES MD     CBC w/ Differential    Collection Time: 23  4:52 PM   Result Value Ref Range    WBC 16.6 (H) 4.8 - 10.8 K/uL    RBC 5.98 (H) 4.20 - 5.40 M/uL    Hemoglobin 11.5 (L) 12.0 - 16.0 g/dL    Hematocrit 39.7 37.0 - 47.0 %    MCV 66.4 (L) 81.4 - 97.8 fL    MCH 19.2 (L) 27.0 - 33.0 pg    MCHC 29.0 (L) 33.6 - 35.0 g/dL    RDW 49.5 35.9 - 50.0 fL    Platelet Count 751 (H) 164 - 446 K/uL    MPV 10.3 9.0 - 12.9 fL    Neutrophils-Polys 75.50 (H) 44.00 - 72.00 %    Lymphocytes 12.70 (L) 22.00 - 41.00 %    Monocytes 7.20 0.00 - 13.40 %    Eosinophils 3.10 0.00 - 6.90 %    Basophils 0.80 0.00 - 1.80 %    Immature Granulocytes 0.70 0.00 - 0.90 %    Nucleated RBC 0.00 /100 WBC    Neutrophils (Absolute) 12.52 (H) 2.00 - 7.15 K/uL    Lymphs (Absolute) 2.11 1.00 - 4.80 K/uL    Monos (Absolute) 1.20 (H) 0.00 - 0.85 K/uL    Eos (Absolute) 0.51 0.00 - 0.51 K/uL    Baso (Absolute) 0.14 (H) 0.00 - 0.12 K/uL    Immature  Granulocytes (abs) 0.11 0.00 - 0.11 K/uL    NRBC (Absolute) 0.00 K/uL    Hypochromia 2+ (A)     Anisocytosis 1+     Microcytosis 1+    Complete Metabolic Panel (CMP)    Collection Time: 01/02/23  4:52 PM   Result Value Ref Range    Sodium 138 135 - 145 mmol/L    Potassium 3.6 3.6 - 5.5 mmol/L    Chloride 104 96 - 112 mmol/L    Co2 23 20 - 33 mmol/L    Anion Gap 11.0 7.0 - 16.0    Glucose 103 (H) 65 - 99 mg/dL    Bun 18 8 - 22 mg/dL    Creatinine 0.47 (L) 0.50 - 1.40 mg/dL    Calcium 9.1 8.5 - 10.5 mg/dL    AST(SGOT) 18 12 - 45 U/L    ALT(SGPT) 12 2 - 50 U/L    Alkaline Phosphatase 102 (H) 30 - 99 U/L    Total Bilirubin 0.4 0.1 - 1.5 mg/dL    Albumin 3.1 (L) 3.2 - 4.9 g/dL    Total Protein 7.5 6.0 - 8.2 g/dL    Globulin 4.4 (H) 1.9 - 3.5 g/dL    A-G Ratio 0.7 g/dL   Troponin STAT    Collection Time: 01/02/23  4:52 PM   Result Value Ref Range    Troponin T 17 6 - 19 ng/L   proBrain Natriuretic Peptide, NT    Collection Time: 01/02/23  4:52 PM   Result Value Ref Range    NT-proBNP 4703 (H) 0 - 125 pg/mL   TSH (for screening thyroid dysfunction)    Collection Time: 01/02/23  4:52 PM   Result Value Ref Range    TSH 1.080 0.380 - 5.330 uIU/mL   Free Thyroxine (add to TSH for patients with existing thyroid dysfunction)    Collection Time: 01/02/23  4:52 PM   Result Value Ref Range    Free T-4 1.64 0.93 - 1.70 ng/dL   CORRECTED CALCIUM    Collection Time: 01/02/23  4:52 PM   Result Value Ref Range    Correct Calcium 9.8 8.5 - 10.5 mg/dL   ESTIMATED GFR    Collection Time: 01/02/23  4:52 PM   Result Value Ref Range    GFR (CKD-EPI) 96 >60 mL/min/1.73 m 2   MORPHOLOGY    Collection Time: 01/02/23  4:52 PM   Result Value Ref Range    RBC Morphology Present    PERIPHERAL SMEAR REVIEW    Collection Time: 01/02/23  4:52 PM   Result Value Ref Range    Peripheral Smear Review see below    PLATELET ESTIMATE    Collection Time: 01/02/23  4:52 PM   Result Value Ref Range    Plt Estimation Increased    DIFFERENTIAL COMMENT     Collection Time: 01/02/23  4:52 PM   Result Value Ref Range    Comments-Diff see below    PROCALCITONIN    Collection Time: 01/02/23  4:52 PM   Result Value Ref Range    Procalcitonin 0.13 <0.25 ng/mL   EC-ECHOCARDIOGRAM COMPLETE W/O CONT    Collection Time: 01/02/23 10:13 PM   Result Value Ref Range    Eject.Frac. MOD BP 36.3     Eject.Frac. MOD 4C 27.32     Eject.Frac. MOD 2C 44.33     Left Ventrical Ejection Fraction 35    CBC with Differential    Collection Time: 01/03/23  4:37 AM   Result Value Ref Range    WBC 14.2 (H) 4.8 - 10.8 K/uL    RBC 6.36 (H) 4.20 - 5.40 M/uL    Hemoglobin 12.0 12.0 - 16.0 g/dL    Hematocrit 42.1 37.0 - 47.0 %    MCV 66.2 (L) 81.4 - 97.8 fL    MCH 18.9 (L) 27.0 - 33.0 pg    MCHC 28.5 (L) 33.6 - 35.0 g/dL    RDW 49.0 35.9 - 50.0 fL    Platelet Count 848 (H) 164 - 446 K/uL    MPV 11.2 9.0 - 12.9 fL    Neutrophils-Polys 64.60 44.00 - 72.00 %    Lymphocytes 19.60 (L) 22.00 - 41.00 %    Monocytes 8.60 0.00 - 13.40 %    Eosinophils 5.30 0.00 - 6.90 %    Basophils 1.20 0.00 - 1.80 %    Immature Granulocytes 0.70 0.00 - 0.90 %    Nucleated RBC 0.00 /100 WBC    Neutrophils (Absolute) 9.17 (H) 2.00 - 7.15 K/uL    Lymphs (Absolute) 2.79 1.00 - 4.80 K/uL    Monos (Absolute) 1.22 (H) 0.00 - 0.85 K/uL    Eos (Absolute) 0.76 (H) 0.00 - 0.51 K/uL    Baso (Absolute) 0.17 (H) 0.00 - 0.12 K/uL    Immature Granulocytes (abs) 0.10 0.00 - 0.11 K/uL    NRBC (Absolute) 0.00 K/uL   Comp Metabolic Panel (CMP)    Collection Time: 01/03/23  4:37 AM   Result Value Ref Range    Sodium 138 135 - 145 mmol/L    Potassium 3.6 3.6 - 5.5 mmol/L    Chloride 103 96 - 112 mmol/L    Co2 23 20 - 33 mmol/L    Anion Gap 12.0 7.0 - 16.0    Glucose 99 65 - 99 mg/dL    Bun 12 8 - 22 mg/dL    Creatinine 0.51 0.50 - 1.40 mg/dL    Calcium 8.8 8.5 - 10.5 mg/dL    AST(SGOT) 11 (L) 12 - 45 U/L    ALT(SGPT) 11 2 - 50 U/L    Alkaline Phosphatase 104 (H) 30 - 99 U/L    Total Bilirubin 0.6 0.1 - 1.5 mg/dL    Albumin 3.1 (L) 3.2 - 4.9 g/dL     Total Protein 7.5 6.0 - 8.2 g/dL    Globulin 4.4 (H) 1.9 - 3.5 g/dL    A-G Ratio 0.7 g/dL   Magnesium    Collection Time: 01/03/23  4:37 AM   Result Value Ref Range    Magnesium 1.7 1.5 - 2.5 mg/dL   CORRECTED CALCIUM    Collection Time: 01/03/23  4:37 AM   Result Value Ref Range    Correct Calcium 9.5 8.5 - 10.5 mg/dL   ESTIMATED GFR    Collection Time: 01/03/23  4:37 AM   Result Value Ref Range    GFR (CKD-EPI) 94 >60 mL/min/1.73 m 2       Imaging/Procedures Review:    Chest Xray:  Reviewed    EKG:   As in HPI. Showed afib     ECHO:  1/2/2023  The left ventricle is mildly dilated.  Moderately reduced left ventricular systolic function.  The left ventricular ejection fraction is visually estimated to be 35%.  Global hypokinesis with regional variation.  Mild to moderate mitral regurgitation.  Aortic valve sclerosis without stenosis.  Normal right ventricular size and systolic function.  Severely dilated left atrium. Enlarged right atrium.  Pleural effusion present.  No prior study is available for comparison.     MDM (Assessment and Plan):     Active Hospital Problems    Diagnosis     Acute systolic heart failure (HCC) [I50.21]     Atrial fibrillation with rapid ventricular response (HCC) [I48.91]     SIRS (systemic inflammatory response syndrome) (HCC) [R65.10]     Polycythemia vera (HCC) [D45]     Elevated brain natriuretic peptide (BNP) level [R79.89]          # new onset of atrial fibrillation with RVR  # new onset of HFrEF 35%  # elevated probnp  # severely dilated left atrium   # mild to moderate mitral regurgitation   # COVID pneumonia   # pleural effusion   # history of occular CVA 2020  # polycytemia vera    - continue furosemide 40mg IV qd  - added aldactone 25mg qd   - if tolerate aldactone, add ace/ arb   - continue metoprolol 25mg BID, if fluid status improves we will schedule her for [facility] guided cardioversion tomorrow. Please keep npo  -  [id number] (CHF/CM, HTN, Age, DM, CVA, Vascular disease,  Gender) = 6. Continue eliquis 5mg BID   - check for d-dimer   - strict I and O, daily stand up weight       I personally spent a total of 26 minutes which includes face-to-face time and non-face-to-face time spent on preparing to see the patient, reviewing hospital notes and tests, obtaining history from the patient, performing a medically appropriate exam, counseling and educating the patient, ordering medications/tests/procedures/referrals as clinically indicated, and documenting information in the electronic medical record.     CHRISTINE Barragan   [facility]

## 2023-01-03 NOTE — ED PROVIDER NOTES
ER Provider Note    Scribed for Antonio Chase M.d. by Tami Lake. 1/2/2023  4:34 PM    Primary Care Provider: No primary care provider noted.    CHIEF COMPLAINT  Chief Complaint   Patient presents with    Rapid Heart Beat     New onset afib     EXTERNAL RECORDS REVIEWED  Seen last in the outpatient world for polycythemia vera and mid October    HPI  LIMITATION TO HISTORY   Select: : None    Rose Marie Romero is a 82 y.o. female who presents to the ED complaining of rapid heart beat. She went to the urgent care today who recommend that she come to the ED for evaluation. She has never been diagnosed with atrial fibrillation. She noticed her heart rate changed when she had COVID which began 10 days ago. She states that her symptoms have improved. She takes that Crestor and aspirin. She has a medical history of polycythemia. She has associated bilateral leg tenderness and shortness of breath. She denies any fevers.    REVIEW OF SYSTEMS  Pertinent positives include rapid heart rate, bilateral leg tenderness and shortness of breath. Pertinent negatives include no fevers.  All other systems reviewed and negative.     PAST MEDICAL HISTORY  Past Medical History:   Diagnosis Date    Bursitis     hip     IBS (irritable bowel syndrome)     Polycythemia rubra vera (HCC)        SURGICAL HISTORY  Past Surgical History:   Procedure Laterality Date    VA LAP,APPENDECTOMY N/A 12/20/2021    Procedure: APPENDECTOMY, LAPAROSCOPIC;  Surgeon: Berhane Arango M.D.;  Location: SURGERY [facility];  Service: General    GYN SURGERY      hysterectopy     OTHER      bladder prolapse        FAMILY HISTORY  None noted.     SOCIAL HISTORY   reports that she has never smoked. She has never used smokeless tobacco. She reports that she does not drink alcohol and does not use drugs.    CURRENT MEDICATIONS  Previous Medications    ACETAMINOPHEN (TYLENOL PO)    Take 2 Tablets by mouth 2 times a day as needed (For pain). Pt is not  "sure the strength    ASPIRIN EC (ECOTRIN) 81 MG TABLET DELAYED RESPONSE    Take 162 mg by mouth every day.    CHOLECALCIFEROL (D3 PO)    Take 1 Capsule by mouth every day.    MAGNESIUM PO    Take 1 Capsule by mouth every evening.    ROSUVASTATIN (CRESTOR) 5 MG TAB    Take 5 mg by mouth every day.       ALLERGIES  Doxycycline, Latex, Morphine, Other drug, and Penicillins    PHYSICAL EXAM  BP (!) 135/95   Pulse (!) 134   Temp 36.9 °C (98.5 °F) (Temporal)   Resp 20   Ht 1.626 m (5' 4\")   Wt 63.5 kg (140 lb)   SpO2 93%   BMI 24.03 kg/m²   Constitutional: Well developed, Well nourished, mild respiratory distress, Non-toxic appearance.   HENT: Normocephalic, Atraumatic, Bilateral external ears normal, Oropharynx is clear mucous membranes are moist. No oral exudates or nasal discharge.   Eyes: Pupils are equal round and reactive, EOMI, Conjunctiva normal, No discharge.   Neck: Normal range of motion, No tenderness, Supple, No stridor. No meningismus.  Lymphatic: No lymphadenopathy noted.   Cardiovascular: Tachycardic and irregular rate and rhythm without murmur rub or gallop.  Thorax & Lungs: Mild bibasilar r rales. There is no chest wall tenderness.   Abdomen: Soft non-tender non-distended. There is no rebound or guarding. No organomegaly is appreciated. Bowel sounds are normal.  Skin: Normal without rash.   Back: No CVA or spinal tenderness.   Extremities: Intact distal pulses, No edema, No tenderness, No cyanosis, No clubbing. Capillary refill is less than 2 seconds.  Musculoskeletal: Good range of motion in all major joints. No tenderness to palpation or major deformities noted.   Neurologic: Alert & oriented x 3, Normal motor function, Normal sensory function, No focal deficits noted. Reflexes are normal.  Psychiatric: Affect normal, Judgment normal, Mood normal. There is no suicidal ideation or patient reported hallucinations.      Tachy  and irr  DIAGNOSTIC STUDIES    Labs:   Labs Reviewed   CBC WITH " DIFFERENTIAL - Abnormal; Notable for the following components:       Result Value    WBC 16.6 (*)     RBC 5.98 (*)     Hemoglobin 11.5 (*)     MCV 66.4 (*)     MCH 19.2 (*)     MCHC 29.0 (*)     Platelet Count 751 (*)     Neutrophils-Polys 75.50 (*)     Lymphocytes 12.70 (*)     Neutrophils (Absolute) 12.52 (*)     Monos (Absolute) 1.20 (*)     Baso (Absolute) 0.14 (*)     Hypochromia 2+ (*)     All other components within normal limits    Narrative:     Enhanced Droplet, Contact, and Eye Protection  Biotin intake of greater than 5 mg per day may interfere with  troponin levels, causing false low values.   COMP METABOLIC PANEL - Abnormal; Notable for the following components:    Glucose 103 (*)     Creatinine 0.47 (*)     Alkaline Phosphatase 102 (*)     Albumin 3.1 (*)     Globulin 4.4 (*)     All other components within normal limits    Narrative:     Enhanced Droplet, Contact, and Eye Protection  Biotin intake of greater than 5 mg per day may interfere with  troponin levels, causing false low values.   PROBRAIN NATRIURETIC PEPTIDE, NT - Abnormal; Notable for the following components:    NT-proBNP 4703 (*)     All other components within normal limits    Narrative:     Enhanced Droplet, Contact, and Eye Protection  Biotin intake of greater than 5 mg per day may interfere with  troponin levels, causing false low values.   TROPONIN    Narrative:     Enhanced Droplet, Contact, and Eye Protection  Biotin intake of greater than 5 mg per day may interfere with  troponin levels, causing false low values.   TSH    Narrative:     Enhanced Droplet, Contact, and Eye Protection  Biotin intake of greater than 5 mg per day may interfere with  troponin levels, causing false low values.   FREE THYROXINE    Narrative:     Enhanced Droplet, Contact, and Eye Protection  Biotin intake of greater than 5 mg per day may interfere with  troponin levels, causing false low values.   CORRECTED CALCIUM    Narrative:     Enhanced Droplet,  Contact, and Eye Protection  Biotin intake of greater than 5 mg per day may interfere with  troponin levels, causing false low values.   ESTIMATED GFR    Narrative:     Enhanced Droplet, Contact, and Eye Protection  Biotin intake of greater than 5 mg per day may interfere with  troponin levels, causing false low values.   MORPHOLOGY    Narrative:     Enhanced Droplet, Contact, and Eye Protection  Biotin intake of greater than 5 mg per day may interfere with  troponin levels, causing false low values.   PERIPHERAL SMEAR REVIEW    Narrative:     Enhanced Droplet, Contact, and Eye Protection  Biotin intake of greater than 5 mg per day may interfere with  troponin levels, causing false low values.   PLATELET ESTIMATE    Narrative:     Enhanced Droplet, Contact, and Eye Protection  Biotin intake of greater than 5 mg per day may interfere with  troponin levels, causing false low values.   DIFFERENTIAL COMMENT    Narrative:     Enhanced Droplet, Contact, and Eye Protection  Biotin intake of greater than 5 mg per day may interfere with  troponin levels, causing false low values.         EKG:   Results for orders placed or performed during the hospital encounter of 23   EKG (NOW)   Result Value Ref Range    Report       [facility] Emergency Dept[facility]    Test Date:  2023  Pt Name:    TYRONE WALLIS               Department: ER  MRN:        [id number]                      Room:       RD 10  Gender:     Female                       Technician: [id number]  :        [id number]                   Requested By:ER TRIAGE PROTOCOL  Order #:    [id number]                    Reading MD: REHANA FORBES MD    Measurements  Intervals                                Axis  Rate:       116                          P:  MO:                                      QRS:        -4  QRSD:       93                           T:          -62  QT:         334  QTc:        465    Interpretive Statements  Atrial fibrillation  Left  ventricular hypertrophy  Nonspecific T abnormalities, inferior leads  Compared to ECG 12/20/2021 18:41:27  Left ventricular hypertrophy now present  T-wave abnormality now present  Sinus rhythm no longer present  Poor R-wave progression no longer present  Electronically Signed On 1-2-2023 19:09 :59 PST by REHANA FORBES MD           Radiology:   The attending emergency physician has independently interpreted the diagnostic imaging associated with this visit and am waiting the final reading from the radiologist.     DX-CHEST-PORTABLE (1 VIEW)   Final Result      Small left pleural effusion with atelectasis and/or consolidation.            COURSE & MEDICAL DECISION MAKING     Nursing notes, vital signs, PMSFSHx reviewed in chart     ED Observation Status? Yes; Patient placed in observation status at 4:40 PM, 1/2/2023.     INITIAL ASSESSMENT AND PLAN  Patient's EKG demonstrates atrial fibrillation with rapid ventricular response as well as left ventricular hypertrophy.  Clinically she is acting as if she may be volume overloaded as well and historically she has had a recent COVID infection.    4:40 PM - Patient seen and examined at bedside. The patient will be medicated with Cardiezem 15.9 mg and NS infusion 1,000 mg. The patient was resuscitated with IV fluids secondary to tachycardia.   Ordered for DX-chest, CBCB w/ diff, CMP, troponin stat, pBNP, TSH, free thyroxine, and EKG to evaluate her symptoms.      Chest x-ray shows small left-sided pleural effusion with atelectasis favored over consolidation    Laboratory evaluation reveals leukocytosis at over 16,000 with 75% PMN shift.  She has a history of polycythemia vera however hemoglobin today is only 11.5.  Platelet count is elevated at 751,000.  There is no evidence of electrolyte derangements or acidosis and troponin is normal.  BNP is elevated at 4700 with normal free T4 and TSH.\    6:41 PM - Ordered Lasix 40 mg.  At this point Cardizem has improved her rate  considerably.  She is starting to creep up to 110 however and we will give her a second dose of Cardizem at 10 mg and start her on metoprolol 50 mg orally          FINAL PROBLEM LIST AND DISPOSITION  1.  Atrial fibrillation with rapid ventricular response.  Admit to hospital for rate control, echo, diuresis    2.  Volume overload.  Continue diuresis with Lasix    3.  Recent COVID infection.  No hypoxia at present    I have discussed management of the patient with the following physicians and MONSTER's: Spoke with internal medicine resident at approximately 7:05 PM for admission and patient is updated on plan of care    Discussion of management with other [facility] or appropriate source(s): Select: Pharmacy spoke with pharmacy about Eliquis dosing      CRITICAL CARE  The very real possibilty of a deterioration of this patient's condition required the highest level of my preparedness for sudden, emergent intervention.  I provided critical care services, which included medication orders, frequent reevaluations of the patient's condition and response to treatment, ordering and reviewing test results, and discussing the case with various consultants.  The critical care time associated with the care of the patient was 35 minutes . Review chart for interventions. This time is exclusive of any other billable procedures.     DISPOSITION:  Patient will be hospitalized by internal medicine in guarded condition.    FINAL IMPRESSION   1. Atrial fibrillation with RVR (HCC)    2. COVID-19 virus infection    3.  Critical care time, 35 minutes       Tami SANCHEZ), am scribing for, and in the presence of, Antonio Chase M.D..    Electronically signed by: Tami Red), 1/2/2023    Antonio SANCHEZ M.D. personally performed the services described in this documentation, as scribed by Tami Lake in my presence, and it is both accurate and complete.     The note accurately reflects work and decisions  made by me.  Antonio Chase M.D.  1/2/2023  7:16 PM

## 2023-01-03 NOTE — ASSESSMENT & PLAN NOTE
Patient with history of polycythemia vera, noted to have chronically elevated WBC, platelets as well  - Continue outpatient follow-up and monitoring

## 2023-01-03 NOTE — ED NOTES
Janine RN accepting nurse here to take the pt to the floor. Pt going up on tele monitor. Care assumed by the accepting nurse. Pt purwick cannister dumped. PT produced 1100 cc urine.

## 2023-01-03 NOTE — HOSPITAL COURSE
Ms. Rose Marie Romero is a 82 y.o. female with history of polycythemia vera who presented 1/2/2023 at the recommendation of urgent care in the setting of new onset A. fib with rapid ventricular rate.      Patient initially presented to urgent care for follow-up after having been ill due to COVID pneumonia and she experienced on December 23, 2022.  At that time she was discharged with Z-Joe and told to follow-up should her symptoms worsen or fail to resolve.  She presented today in setting of shortness of breath, and found to have new onset A. fib with RVR.     Evaluation of the emergency department revealed heart rate ranging from  with normal blood pressure.  Labs notable for elevated BNP of 4703, normal troponin, normal TSH.  Chest x-ray reviewed and noted to have left pleural effusion.     Patient received IV diltiazem 10 mg x 2 in addition to 50 mg of metoprolol.  Patient's heart rate did improve, still remains in atrial fibrillation.  Patient admitted into the observation unit for further monitoring.    An echocardiogram has also been obtained which noted an LVEF approximately 35%.  Patient exhibits some heart failure symptoms including requirement of oxygen along with mild shortness of breath.  Cardiology consulted for management of care.

## 2023-01-03 NOTE — PROGRESS NOTES
[facility] Medicine Daily Progress Note    Date of Service  1/3/2023    Chief Complaint  Rose Marie Romero is a 82 y.o. female admitted 1/2/2023 with palpitations and SOB    Hospital Course  Ms. Rose Marie Romero is a 82 y.o. female with history of polycythemia vera who presented 1/2/2023 at the recommendation of urgent care in the setting of new onset A. fib with rapid ventricular rate.      Patient initially presented to urgent care for follow-up after having been ill due to COVID pneumonia and she experienced on December 23, 2022.  At that time she was discharged with Z-Joe and told to follow-up should her symptoms worsen or fail to resolve.  She presented today in setting of shortness of breath, and found to have new onset A. fib with RVR.     Evaluation of the emergency department revealed heart rate ranging from  with normal blood pressure.  Labs notable for elevated BNP of 4703, normal troponin, normal TSH.  Chest x-ray reviewed and noted to have left pleural effusion.     Patient received IV diltiazem 10 mg x 2 in addition to 50 mg of metoprolol.  Patient's heart rate did improve, still remains in atrial fibrillation.  Patient admitted into the observation unit for further monitoring.    An echocardiogram has also been obtained which noted an LVEF approximately 35%.  Patient exhibits some heart failure symptoms including requirement of oxygen along with mild shortness of breath.  Cardiology consulted for management of care.    Interval Problem Update  -Patient seen and examined.  Discussed with patient results from echocardiogram along with plan of care and management of atrial fibrillation.  Patient still notes some mild shortness of breath, requiring oxygen, and easily fatigued when she ambulates.  -Plan of care: Continue to monitor patient; order 1 dose of IV Lasix due to SOB; consulted cardiology for management of new onset A. fib along with new onset HF  -Disposition: Patient anticipated  to stay overnight under observation for management of new onset A. fib and new onset HF  -Lab work: Reviewed; expected  -VSS at this time    I have discussed this patient's plan of care and discharge plan at IDT rounds today with Case Management, Nursing, Nursing leadership, and other members of the IDT team.    Consultants/Specialty  Cardiology    Code Status  Full Code    Disposition  Patient is not medically cleared for discharge.   Anticipate discharge to to home with close outpatient follow-up.  I have placed the appropriate orders for post-discharge needs.    Review of Systems  Review of Systems   Constitutional:  Positive for malaise/fatigue. Negative for chills and fever.   HENT: Negative.     Eyes: Negative.    Respiratory:  Positive for shortness of breath and wheezing.    Cardiovascular:  Positive for chest pain.   Gastrointestinal: Negative.    Genitourinary: Negative.    Musculoskeletal:  Positive for myalgias.   Skin: Negative.    Neurological:  Positive for weakness.   Endo/Heme/Allergies: Negative.    Psychiatric/Behavioral: Negative.        Physical Exam  Temp:  [36.7 °C (98.1 °F)-37.3 °C (99.1 °F)] 37.1 °C (98.8 °F)  Pulse:  [] 101  Resp:  [18-20] 18  BP: (116-156)/() 119/65  SpO2:  [85 %-95 %] 92 %    Physical Exam  Vitals and nursing note reviewed.   HENT:      Head: Normocephalic.      Nose: Nose normal.      Mouth/Throat:      Mouth: Mucous membranes are moist.      Pharynx: Oropharynx is clear.   Eyes:      Pupils: Pupils are equal, round, and reactive to light.   Cardiovascular:      Rate and Rhythm: Tachycardia present. Rhythm irregular.      Pulses: Normal pulses.      Heart sounds: Normal heart sounds.   Pulmonary:      Effort: Pulmonary effort is normal.      Breath sounds: Normal breath sounds.   Abdominal:      General: Bowel sounds are normal.      Palpations: Abdomen is soft.   Musculoskeletal:         General: Tenderness present.      Cervical back: Normal range of motion  and neck supple.   Skin:     General: Skin is dry.   Neurological:      Mental Status: Mental status is at baseline.       Fluids    Intake/Output Summary (Last 24 hours) at 1/3/2023 1322  Last data filed at 1/3/2023 0859  Gross per 24 hour   Intake --   Output 450 ml   Net -450 ml       Laboratory  Recent Labs     01/02/23  1652 01/03/23  0437   WBC 16.6* 14.2*   RBC 5.98* 6.36*   HEMOGLOBIN 11.5* 12.0   HEMATOCRIT 39.7 42.1   MCV 66.4* 66.2*   MCH 19.2* 18.9*   MCHC 29.0* 28.5*   RDW 49.5 49.0   PLATELETCT 751* 848*   MPV 10.3 11.2     Recent Labs     01/02/23  1652 01/03/23  0437   SODIUM 138 138   POTASSIUM 3.6 3.6   CHLORIDE 104 103   CO2 23 23   GLUCOSE 103* 99   BUN 18 12   CREATININE 0.47* 0.51   CALCIUM 9.1 8.8                   Imaging  EC-ECHOCARDIOGRAM COMPLETE W/O CONT   Final Result      DX-CHEST-PORTABLE (1 VIEW)   Final Result      Small left pleural effusion with atelectasis and/or consolidation.           Assessment/Plan  * Atrial fibrillation with rapid ventricular response (HCC)- (present on admission)  Assessment & Plan  Patient with new onset atrial fibrillation and rapid ventricular response.  [id number] 3.  Suspect A. fib likely secondary to combination of recent illness and senescence.  - Discussed therapeutic anticoagulation with patient, patient understands the risk benefits and alternatives and is amenable to initiation of therapeutic anticoagulation.  - Reinitiate patient on metoprolol tartrate 50 mg twice daily  - Echo ordered - noted LVEF 35%  --Consulted Cardiology      Acute systolic heart failure (HCC)  Assessment & Plan  - Noted echocardiogram LVEF approximately 35%  -Patient exhibiting mild shortness of breath  -Easily gets fatigued during ambulation  -Requiring oxygen  -Ordered 1 dose of IV Lasix  -Cardiology consulted for new onset A. fib along with new onset HF    Elevated brain natriuretic peptide (BNP) level  Assessment & Plan  Concern for possible underlying CHF given  elevated BNP and pleural effusion on chest x-ray.  Received 1 dose of IV Lasix in ED  - Monitor I's and O's, daily weights  - Echo ordered, will f/u results  - Plan to initiate GDMT based on results of echo if necessary    Polycythemia vera (HCC)  Assessment & Plan  Patient with history of polycythemia vera, noted to have chronically elevated WBC, platelets as well  - Continue outpatient follow-up and monitoring      SIRS (systemic inflammatory response syndrome) (HCC)  Assessment & Plan  SIRS criteria identified on my evaluation include:  Tachycardia, with heart rate greater than 90 BPM and Leukocytosis, with WBC greater than 12,000  SIRS is non-infectious (WBC chronically elevated in setting of PV, normal procal), the patient does not have sepsis           VTE prophylaxis: therapeutic anticoagulation with Eliquis    I have performed a physical exam and reviewed and updated ROS and Plan today (1/3/2023). In review of yesterday's note (1/2/2023), there are no changes except as documented above.    ================================================================================================================================================================================================================  Please note that this dictation was created using voice recognition software. I have made every reasonable attempt to correct obvious errors, but there may be errors of grammar and possibly content that I did not discover before finalizing the note.    Electronically signed by:  Dr. SHERLYN Jones, DNP, APRN, FNP-C  Hospitalist Services  [facility]  [phone]  [email or web address]  01/03/23                 [id number]

## 2023-01-04 ENCOUNTER — APPOINTMENT (OUTPATIENT)
Dept: RADIOLOGY | Facility: MEDICAL CENTER | Age: 83
DRG: 308 | End: 2023-01-04
Attending: NURSE PRACTITIONER
Payer: MEDICARE

## 2023-01-04 ENCOUNTER — APPOINTMENT (OUTPATIENT)
Dept: CARDIOLOGY | Facility: MEDICAL CENTER | Age: 83
DRG: 308 | End: 2023-01-04
Attending: NURSE PRACTITIONER
Payer: MEDICARE

## 2023-01-04 PROBLEM — I48.91 ATRIAL FIBRILLATION WITH RVR (HCC): Status: ACTIVE | Noted: 2023-01-04

## 2023-01-04 LAB
ANION GAP SERPL CALC-SCNC: 11 MMOL/L (ref 7–16)
BUN SERPL-MCNC: 14 MG/DL (ref 8–22)
CALCIUM SERPL-MCNC: 9.2 MG/DL (ref 8.5–10.5)
CHLORIDE SERPL-SCNC: 96 MMOL/L (ref 96–112)
CO2 SERPL-SCNC: 27 MMOL/L (ref 20–33)
CREAT SERPL-MCNC: 0.68 MG/DL (ref 0.5–1.4)
EKG IMPRESSION: NORMAL
ERYTHROCYTE [DISTWIDTH] IN BLOOD BY AUTOMATED COUNT: 49.4 FL (ref 35.9–50)
GFR SERPLBLD CREATININE-BSD FMLA CKD-EPI: 88 ML/MIN/1.73 M 2
GLUCOSE SERPL-MCNC: 107 MG/DL (ref 65–99)
HCT VFR BLD AUTO: 41.4 % (ref 37–47)
HGB BLD-MCNC: 11.8 G/DL (ref 12–16)
MCH RBC QN AUTO: 19 PG (ref 27–33)
MCHC RBC AUTO-ENTMCNC: 28.5 G/DL (ref 33.6–35)
MCV RBC AUTO: 66.6 FL (ref 81.4–97.8)
PLATELET # BLD AUTO: 825 K/UL (ref 164–446)
PMV BLD AUTO: 10.7 FL (ref 9–12.9)
POTASSIUM SERPL-SCNC: 3.6 MMOL/L (ref 3.6–5.5)
PROCALCITONIN SERPL-MCNC: 0.12 NG/ML
RBC # BLD AUTO: 6.22 M/UL (ref 4.2–5.4)
SODIUM SERPL-SCNC: 134 MMOL/L (ref 135–145)
TROPONIN T SERPL-MCNC: 14 NG/L (ref 6–19)
TROPONIN T SERPL-MCNC: 14 NG/L (ref 6–19)
TROPONIN T SERPL-MCNC: 16 NG/L (ref 6–19)
TROPONIN T SERPL-MCNC: 16 NG/L (ref 6–19)
TROPONIN T SERPL-MCNC: 18 NG/L (ref 6–19)
WBC # BLD AUTO: 15.1 K/UL (ref 4.8–10.8)

## 2023-01-04 PROCEDURE — 700111 HCHG RX REV CODE 636 W/ 250 OVERRIDE (IP): Performed by: INTERNAL MEDICINE

## 2023-01-04 PROCEDURE — 85027 COMPLETE CBC AUTOMATED: CPT

## 2023-01-04 PROCEDURE — 700102 HCHG RX REV CODE 250 W/ 637 OVERRIDE(OP): Performed by: STUDENT IN AN ORGANIZED HEALTH CARE EDUCATION/TRAINING PROGRAM

## 2023-01-04 PROCEDURE — 99232 SBSQ HOSP IP/OBS MODERATE 35: CPT | Performed by: INTERNAL MEDICINE

## 2023-01-04 PROCEDURE — 700105 HCHG RX REV CODE 258: Performed by: INTERNAL MEDICINE

## 2023-01-04 PROCEDURE — 84145 PROCALCITONIN (PCT): CPT

## 2023-01-04 PROCEDURE — 36415 COLL VENOUS BLD VENIPUNCTURE: CPT

## 2023-01-04 PROCEDURE — 96375 TX/PRO/DX INJ NEW DRUG ADDON: CPT

## 2023-01-04 PROCEDURE — 71275 CT ANGIOGRAPHY CHEST: CPT

## 2023-01-04 PROCEDURE — 87040 BLOOD CULTURE FOR BACTERIA: CPT | Mod: 91

## 2023-01-04 PROCEDURE — 80048 BASIC METABOLIC PNL TOTAL CA: CPT

## 2023-01-04 PROCEDURE — A9270 NON-COVERED ITEM OR SERVICE: HCPCS | Performed by: INTERNAL MEDICINE

## 2023-01-04 PROCEDURE — 84484 ASSAY OF TROPONIN QUANT: CPT | Mod: 91

## 2023-01-04 PROCEDURE — 99233 SBSQ HOSP IP/OBS HIGH 50: CPT | Performed by: NURSE PRACTITIONER

## 2023-01-04 PROCEDURE — A9270 NON-COVERED ITEM OR SERVICE: HCPCS | Performed by: STUDENT IN AN ORGANIZED HEALTH CARE EDUCATION/TRAINING PROGRAM

## 2023-01-04 PROCEDURE — 770020 HCHG ROOM/CARE - TELE (206)

## 2023-01-04 PROCEDURE — 93010 ELECTROCARDIOGRAM REPORT: CPT | Performed by: INTERNAL MEDICINE

## 2023-01-04 PROCEDURE — 700102 HCHG RX REV CODE 250 W/ 637 OVERRIDE(OP): Performed by: INTERNAL MEDICINE

## 2023-01-04 PROCEDURE — 700117 HCHG RX CONTRAST REV CODE 255: Performed by: NURSE PRACTITIONER

## 2023-01-04 RX ORDER — TORSEMIDE 5 MG/1
5 TABLET ORAL
Status: DISCONTINUED | OUTPATIENT
Start: 2023-01-04 | End: 2023-01-06 | Stop reason: HOSPADM

## 2023-01-04 RX ORDER — METOPROLOL SUCCINATE 50 MG/1
50 TABLET, EXTENDED RELEASE ORAL DAILY
Status: DISCONTINUED | OUTPATIENT
Start: 2023-01-05 | End: 2023-01-06 | Stop reason: HOSPADM

## 2023-01-04 RX ORDER — DEXTROSE MONOHYDRATE 50 MG/ML
INJECTION, SOLUTION INTRAVENOUS CONTINUOUS
Status: DISCONTINUED | OUTPATIENT
Start: 2023-01-04 | End: 2023-01-05

## 2023-01-04 RX ADMIN — SPIRONOLACTONE 25 MG: 25 TABLET ORAL at 06:30

## 2023-01-04 RX ADMIN — DEXTROSE MONOHYDRATE 150 MG: 50 INJECTION, SOLUTION INTRAVENOUS at 09:55

## 2023-01-04 RX ADMIN — TORSEMIDE 5 MG: 5 TABLET ORAL at 09:56

## 2023-01-04 RX ADMIN — OMEPRAZOLE 20 MG: 20 CAPSULE, DELAYED RELEASE ORAL at 06:31

## 2023-01-04 RX ADMIN — METOPROLOL SUCCINATE 25 MG: 25 TABLET, EXTENDED RELEASE ORAL at 06:31

## 2023-01-04 RX ADMIN — DOCUSATE SODIUM 50 MG AND SENNOSIDES 8.6 MG 2 TABLET: 8.6; 5 TABLET, FILM COATED ORAL at 06:28

## 2023-01-04 RX ADMIN — APIXABAN 5 MG: 5 TABLET, FILM COATED ORAL at 06:30

## 2023-01-04 RX ADMIN — APIXABAN 5 MG: 5 TABLET, FILM COATED ORAL at 18:31

## 2023-01-04 RX ADMIN — AMIODARONE HYDROCHLORIDE 0.5 MG/MIN: 1.8 INJECTION, SOLUTION INTRAVENOUS at 16:10

## 2023-01-04 RX ADMIN — ROSUVASTATIN 5 MG: 10 TABLET, FILM COATED ORAL at 06:29

## 2023-01-04 RX ADMIN — IOHEXOL 65 ML: 350 INJECTION, SOLUTION INTRAVENOUS at 11:21

## 2023-01-04 RX ADMIN — LOSARTAN POTASSIUM 25 MG: 50 TABLET, FILM COATED ORAL at 18:32

## 2023-01-04 RX ADMIN — AMIODARONE HYDROCHLORIDE 1 MG/MIN: 1.8 INJECTION, SOLUTION INTRAVENOUS at 10:06

## 2023-01-04 ASSESSMENT — ENCOUNTER SYMPTOMS
WHEEZING: 1
PALPITATIONS: 0
CONFUSION: 0
PSYCHIATRIC NEGATIVE: 1
WEAKNESS: 1
GASTROINTESTINAL NEGATIVE: 1
FEVER: 0
CHILLS: 0
SHORTNESS OF BREATH: 1
MYALGIAS: 1
EYES NEGATIVE: 1
FATIGUE: 1

## 2023-01-04 ASSESSMENT — CHA2DS2 SCORE
PRIOR STROKE OR TIA OR THROMBOEMBOLISM: NO
AGE 65 TO 74: NO
SEX: FEMALE
CHF OR LEFT VENTRICULAR DYSFUNCTION: YES
AGE 75 OR GREATER: YES
DIABETES: NO
CHA2DS2 VASC SCORE: 5
VASCULAR DISEASE: NO
HYPERTENSION: YES

## 2023-01-04 NOTE — PROGRESS NOTES
Pt started on amio bolus - NSR 70-90s with rare runs of afib. Pt denies feeling palpations at this time, TAWANNA, RN at bedside. Pending CTA scan

## 2023-01-04 NOTE — PROGRESS NOTES
Afib   Hr 90's-110's  PVCs/BBB    No cardiac complaints accept sob at one point and iv lasix given with full relief

## 2023-01-04 NOTE — PROGRESS NOTES
[facility] Medicine Daily Progress Note    Date of Service  1/4/2023    Chief Complaint  Rose Marie Romero is a 82 y.o. female admitted 1/2/2023 with palpitations and SOB    Hospital Course  Ms. Rose Marie Romero is a 82 y.o. female with history of polycythemia vera who presented 1/2/2023 at the recommendation of urgent care in the setting of new onset A. fib with rapid ventricular rate.      Patient initially presented to urgent care for follow-up after having been ill due to COVID pneumonia and she experienced on December 23, 2022.  At that time she was discharged with Z-Joe and told to follow-up should her symptoms worsen or fail to resolve.  She presented today in setting of shortness of breath, and found to have new onset A. fib with RVR.     Evaluation of the emergency department revealed heart rate ranging from  with normal blood pressure.  Labs notable for elevated BNP of 4703, normal troponin, normal TSH.  Chest x-ray reviewed and noted to have left pleural effusion.     Patient received IV diltiazem 10 mg x 2 in addition to 50 mg of metoprolol.  Patient's heart rate did improve, still remains in atrial fibrillation.  Patient admitted into the observation unit for further monitoring.    An echocardiogram has also been obtained which noted an LVEF approximately 35%.  Patient exhibits some heart failure symptoms including requirement of oxygen along with mild shortness of breath.  Cardiology consulted for management of care.    Interval Problem Update  1/3/2023  -Patient seen and examined.  Discussed with patient results from echocardiogram along with plan of care and management of atrial fibrillation.  Patient still notes some mild shortness of breath, requiring oxygen, and easily fatigued when she ambulates.  -Plan of care: Continue to monitor patient; order 1 dose of IV Lasix due to SOB; consulted cardiology for management of new onset A. fib along with new onset HF  -Disposition: Patient  anticipated to stay overnight under observation for management of new onset A. fib and new onset HF  -Lab work: Reviewed; expected  -VSS at this time    1/4/2022  -Patient seen and examined prior to CTA pulmonary.  Patient still endorses some mild shortness of breath.  Patient also reports that she had sharp chest pain last night of which this went away after 5 minutes without any other intervention.  Discussed with patient plan of care in conjunction with cardiology.  -Plan of care: Cardiology initiated amiodarone gtt., to switch to oral after 24 hours loading dose; hold cardioversion/MARC due to patient being COVID positive approximately 10 days ago; pending CTA pulmonary due to elevated D-dimer, history of COVID, patient traveling; continue anticoagulation due to new onset A. fib with RVR; manage new onset heart failure due to low EF of 35%; cardiology to continue to follow  -Disposition: Patient switched to inpatient status as she is anticipated to stay 2-3 midnights for initiation of amiodarone therapy, rule out source of SOB via CTA pulmonary versus A. fib versus COVID.  -Lab work: Reviewed; expected  -VSS at this time      I have discussed this patient's plan of care and discharge plan at IDT rounds today with Case Management, Nursing, Nursing leadership, and other members of the IDT team.    Consultants/Specialty  Cardiology  - Dr. Merlin Rivera    Code Status  Full Code    Disposition  Patient is not medically cleared for discharge.   Anticipate discharge to to home with close outpatient follow-up.  I have placed the appropriate orders for post-discharge needs.    Review of Systems  Review of Systems   Constitutional:  Positive for malaise/fatigue. Negative for chills and fever.   HENT: Negative.     Eyes: Negative.    Respiratory:  Positive for shortness of breath and wheezing.    Cardiovascular:  Positive for chest pain.   Gastrointestinal: Negative.    Genitourinary: Negative.    Musculoskeletal:   Positive for myalgias.   Skin: Negative.    Neurological:  Positive for weakness.   Endo/Heme/Allergies: Negative.    Psychiatric/Behavioral: Negative.        Physical Exam  Temp:  [36.4 °C (97.5 °F)-37.1 °C (98.8 °F)] 36.8 °C (98.3 °F)  Pulse:  [] 105  Resp:  [15-18] 16  BP: (115-131)/(65-98) 131/98  SpO2:  [85 %-97 %] 95 %    Physical Exam  Vitals and nursing note reviewed.   HENT:      Head: Normocephalic.      Nose: Nose normal.      Mouth/Throat:      Mouth: Mucous membranes are moist.      Pharynx: Oropharynx is clear.   Eyes:      Pupils: Pupils are equal, round, and reactive to light.   Cardiovascular:      Rate and Rhythm: Tachycardia present. Rhythm irregular.      Pulses: Normal pulses.      Heart sounds: Normal heart sounds.   Pulmonary:      Effort: Pulmonary effort is normal.      Breath sounds: Normal breath sounds.   Abdominal:      General: Bowel sounds are normal.      Palpations: Abdomen is soft.   Musculoskeletal:         General: Tenderness present.      Cervical back: Normal range of motion and neck supple.   Skin:     General: Skin is dry.   Neurological:      Mental Status: Mental status is at baseline.       Fluids    Intake/Output Summary (Last 24 hours) at 1/4/2023 1043  Last data filed at 1/4/2023 1000  Gross per 24 hour   Intake 380 ml   Output 1350 ml   Net -970 ml         Laboratory  Recent Labs     01/02/23  1652 01/03/23  0437 01/04/23  0004   WBC 16.6* 14.2* 15.1*   RBC 5.98* 6.36* 6.22*   HEMOGLOBIN 11.5* 12.0 11.8*   HEMATOCRIT 39.7 42.1 41.4   MCV 66.4* 66.2* 66.6*   MCH 19.2* 18.9* 19.0*   MCHC 29.0* 28.5* 28.5*   RDW 49.5 49.0 49.4   PLATELETCT 751* 848* 825*   MPV 10.3 11.2 10.7       Recent Labs     01/02/23  1652 01/03/23  0437 01/04/23  0004   SODIUM 138 138 134*   POTASSIUM 3.6 3.6 3.6   CHLORIDE 104 103 96   CO2 23 23 27   GLUCOSE 103* 99 107*   BUN 18 12 14   CREATININE 0.47* 0.51 0.68   CALCIUM 9.1 8.8 9.2                     Imaging  EC-ECHOCARDIOGRAM COMPLETE  W/O CONT   Final Result      DX-CHEST-PORTABLE (1 VIEW)   Final Result      Small left pleural effusion with atelectasis and/or consolidation.      EC-MARC W/O CONT    (Results Pending)   CL-CARDIOVERSION    (Results Pending)   CT-CTA CHEST PULMONARY ARTERY W/ RECONS    (Results Pending)          Assessment/Plan  * Atrial fibrillation with rapid ventricular response (HCC)- (present on admission)  Assessment & Plan  Patient with new onset atrial fibrillation and rapid ventricular response.  [id number] 3.  Suspect A. fib likely secondary to combination of recent illness and senescence.  - Discussed therapeutic anticoagulation with patient, patient understands the risk benefits and alternatives and is amenable to initiation of therapeutic anticoagulation.  - Reinitiate patient on metoprolol tartrate 50 mg twice daily  - Echo ordered - noted LVEF 35%  --Consulted Cardiology  --Recommendations for cardioversion/MARC, but need to hold due to patient being COVID-positive  --Initiated amiodarone gtt. per cardiology; to switch to oral per cardiology      Acute systolic heart failure (HCC)  Assessment & Plan  - Noted echocardiogram LVEF approximately 35%  -Patient exhibiting mild shortness of breath  -Easily gets fatigued during ambulation  -Requiring oxygen  -Ordered 1 dose of IV Lasix  -Cardiology consulted for new onset A. fib along with new onset HF  -Cardiology initiated HF medication including metoprolol, Aldactone, torsemide, losartan, continue Lasix    Elevated brain natriuretic peptide (BNP) level  Assessment & Plan  Concern for possible underlying CHF given elevated BNP and pleural effusion on chest x-ray.  Received 1 dose of IV Lasix in ED  - Monitor I's and O's, daily weights  - Echo ordered, will f/u results  - Plan to initiate GDMT based on results of echo if necessary    Polycythemia vera (HCC)  Assessment & Plan  Patient with history of polycythemia vera, noted to have chronically elevated WBC, platelets as  well  - Continue outpatient follow-up and monitoring      SIRS (systemic inflammatory response syndrome) (HCC)  Assessment & Plan  SIRS criteria identified on my evaluation include:  Tachycardia, with heart rate greater than 90 BPM and Leukocytosis, with WBC greater than 12,000  SIRS is non-infectious (WBC chronically elevated in setting of PV, normal procal), the patient does not have sepsis           VTE prophylaxis: therapeutic anticoagulation with Eliquis    I have performed a physical exam and reviewed and updated ROS and Plan today (1/4/2023). In review of yesterday's note (1/3/2023), there are no changes except as documented above.    ================================================================================================================================================================================================================  Please note that this dictation was created using voice recognition software. I have made every reasonable attempt to correct obvious errors, but there may be errors of grammar and possibly content that I did not discover before finalizing the note.    Electronically signed by:  Dr. SHERLYN Jones, DNP, APRN, FNP-C  Hospitalist Services  [facility]  [phone]  [email or web address]  01/04/23                 [id number]

## 2023-01-04 NOTE — PROGRESS NOTES
Pt in AFIB RVR mobilized to bedside commode - -140s AFIB. Pt reports feeling palpitations, NA Martha at bedside

## 2023-01-04 NOTE — PROGRESS NOTES
NOC HOSPITALIST CROSS COVER    Notified by RN regarding new onset chest pressure that was described as a 5 out of 10.  Patient's chest pressure lasted only approximately 15 minutes before subsiding without intervention. Orders placed for stat EKG and troponin.  EKG was notable for concern for new left bundle branch block.  Given patient's left bundle branch block in the setting of chest pain cardiology was contacted.  As the patient was no longer having chest pain cardiology recommended to trend troponins.  Patient remained asymptomatic throughout the evening and troponins remained within normal limits.    -----------------------------------------------------------------------------------------------------------    Electronically signed by:  JEREMIAS Eagle PA-C  Hospitalist Services

## 2023-01-04 NOTE — CARE PLAN
The patient is Watcher - Medium risk of patient condition declining or worsening    Shift Goals  Clinical Goals: MARC Wean O2  Patient Goals: rest, comfort  Family Goals: not at bedside    Progress made toward(s) clinical / shift goals:    Problem: Knowledge Deficit - Standard  Goal: Patient and family/care givers will demonstrate understanding of plan of care, disease process/condition, diagnostic tests and medications  Outcome: Progressing       Patient is not progressing towards the following goals:

## 2023-01-05 ENCOUNTER — APPOINTMENT (OUTPATIENT)
Dept: CARDIOLOGY | Facility: MEDICAL CENTER | Age: 83
DRG: 308 | End: 2023-01-05
Attending: NURSE PRACTITIONER
Payer: MEDICARE

## 2023-01-05 LAB
ANION GAP SERPL CALC-SCNC: 10 MMOL/L (ref 7–16)
BUN SERPL-MCNC: 17 MG/DL (ref 8–22)
CALCIUM SERPL-MCNC: 9.2 MG/DL (ref 8.5–10.5)
CHLORIDE SERPL-SCNC: 94 MMOL/L (ref 96–112)
CO2 SERPL-SCNC: 26 MMOL/L (ref 20–33)
CREAT SERPL-MCNC: 0.69 MG/DL (ref 0.5–1.4)
ERYTHROCYTE [DISTWIDTH] IN BLOOD BY AUTOMATED COUNT: 49.7 FL (ref 35.9–50)
GFR SERPLBLD CREATININE-BSD FMLA CKD-EPI: 88 ML/MIN/1.73 M 2
GLUCOSE SERPL-MCNC: 110 MG/DL (ref 65–99)
HCT VFR BLD AUTO: 39.8 % (ref 37–47)
HGB BLD-MCNC: 11.5 G/DL (ref 12–16)
LV EJECT FRACT  99904: 45
LV EJECT FRACT MOD 2C 99903: 51.68
LV EJECT FRACT MOD 4C 99902: 53.22
LV EJECT FRACT MOD BP 99901: 50.21
MCH RBC QN AUTO: 19.4 PG (ref 27–33)
MCHC RBC AUTO-ENTMCNC: 28.9 G/DL (ref 33.6–35)
MCV RBC AUTO: 67.1 FL (ref 81.4–97.8)
PLATELET # BLD AUTO: 781 K/UL (ref 164–446)
PMV BLD AUTO: 11.3 FL (ref 9–12.9)
POTASSIUM SERPL-SCNC: 3.7 MMOL/L (ref 3.6–5.5)
RBC # BLD AUTO: 5.93 M/UL (ref 4.2–5.4)
SODIUM SERPL-SCNC: 130 MMOL/L (ref 135–145)
WBC # BLD AUTO: 14.7 K/UL (ref 4.8–10.8)

## 2023-01-05 PROCEDURE — 700105 HCHG RX REV CODE 258: Performed by: INTERNAL MEDICINE

## 2023-01-05 PROCEDURE — 36415 COLL VENOUS BLD VENIPUNCTURE: CPT

## 2023-01-05 PROCEDURE — A9270 NON-COVERED ITEM OR SERVICE: HCPCS | Performed by: INTERNAL MEDICINE

## 2023-01-05 PROCEDURE — 93308 TTE F-UP OR LMTD: CPT

## 2023-01-05 PROCEDURE — 80048 BASIC METABOLIC PNL TOTAL CA: CPT

## 2023-01-05 PROCEDURE — 770020 HCHG ROOM/CARE - TELE (206)

## 2023-01-05 PROCEDURE — 93308 TTE F-UP OR LMTD: CPT | Mod: 26 | Performed by: INTERNAL MEDICINE

## 2023-01-05 PROCEDURE — 700102 HCHG RX REV CODE 250 W/ 637 OVERRIDE(OP): Performed by: INTERNAL MEDICINE

## 2023-01-05 PROCEDURE — 99233 SBSQ HOSP IP/OBS HIGH 50: CPT | Performed by: STUDENT IN AN ORGANIZED HEALTH CARE EDUCATION/TRAINING PROGRAM

## 2023-01-05 PROCEDURE — A9270 NON-COVERED ITEM OR SERVICE: HCPCS | Performed by: STUDENT IN AN ORGANIZED HEALTH CARE EDUCATION/TRAINING PROGRAM

## 2023-01-05 PROCEDURE — 85027 COMPLETE CBC AUTOMATED: CPT

## 2023-01-05 PROCEDURE — 700102 HCHG RX REV CODE 250 W/ 637 OVERRIDE(OP): Performed by: STUDENT IN AN ORGANIZED HEALTH CARE EDUCATION/TRAINING PROGRAM

## 2023-01-05 PROCEDURE — 99232 SBSQ HOSP IP/OBS MODERATE 35: CPT | Mod: FS | Performed by: NURSE PRACTITIONER

## 2023-01-05 PROCEDURE — A9270 NON-COVERED ITEM OR SERVICE: HCPCS | Performed by: NURSE PRACTITIONER

## 2023-01-05 PROCEDURE — 700102 HCHG RX REV CODE 250 W/ 637 OVERRIDE(OP): Performed by: NURSE PRACTITIONER

## 2023-01-05 PROCEDURE — 700111 HCHG RX REV CODE 636 W/ 250 OVERRIDE (IP): Performed by: INTERNAL MEDICINE

## 2023-01-05 RX ORDER — AMIODARONE HYDROCHLORIDE 200 MG/1
200 TABLET ORAL
Status: DISCONTINUED | OUTPATIENT
Start: 2023-01-13 | End: 2023-01-06 | Stop reason: HOSPADM

## 2023-01-05 RX ORDER — AMIODARONE HYDROCHLORIDE 200 MG/1
400 TABLET ORAL TWICE DAILY
Status: DISCONTINUED | OUTPATIENT
Start: 2023-01-05 | End: 2023-01-06 | Stop reason: HOSPADM

## 2023-01-05 RX ADMIN — APIXABAN 5 MG: 5 TABLET, FILM COATED ORAL at 06:02

## 2023-01-05 RX ADMIN — SPIRONOLACTONE 25 MG: 25 TABLET ORAL at 06:02

## 2023-01-05 RX ADMIN — AMIODARONE HYDROCHLORIDE 400 MG: 200 TABLET ORAL at 17:36

## 2023-01-05 RX ADMIN — DOCUSATE SODIUM 50 MG AND SENNOSIDES 8.6 MG 2 TABLET: 8.6; 5 TABLET, FILM COATED ORAL at 06:02

## 2023-01-05 RX ADMIN — AMIODARONE HYDROCHLORIDE 0.5 MG/MIN: 1.8 INJECTION, SOLUTION INTRAVENOUS at 04:21

## 2023-01-05 RX ADMIN — AMIODARONE HYDROCHLORIDE 400 MG: 200 TABLET ORAL at 10:18

## 2023-01-05 RX ADMIN — ROSUVASTATIN 5 MG: 10 TABLET, FILM COATED ORAL at 06:02

## 2023-01-05 RX ADMIN — TORSEMIDE 5 MG: 5 TABLET ORAL at 06:02

## 2023-01-05 RX ADMIN — ACETAMINOPHEN 650 MG: 325 TABLET ORAL at 19:56

## 2023-01-05 RX ADMIN — OMEPRAZOLE 20 MG: 20 CAPSULE, DELAYED RELEASE ORAL at 06:02

## 2023-01-05 RX ADMIN — LOSARTAN POTASSIUM 25 MG: 50 TABLET, FILM COATED ORAL at 17:36

## 2023-01-05 RX ADMIN — METOPROLOL SUCCINATE 50 MG: 50 TABLET, EXTENDED RELEASE ORAL at 06:02

## 2023-01-05 RX ADMIN — APIXABAN 5 MG: 5 TABLET, FILM COATED ORAL at 17:36

## 2023-01-05 RX ADMIN — DEXTROSE MONOHYDRATE: 50 INJECTION, SOLUTION INTRAVENOUS at 00:19

## 2023-01-05 ASSESSMENT — ENCOUNTER SYMPTOMS
COUGH: 0
FEVER: 0
WEAKNESS: 1
CHOKING: 0
CHILLS: 0
CHEST TIGHTNESS: 0
FATIGUE: 1
GASTROINTESTINAL NEGATIVE: 1
EYES NEGATIVE: 1
SHORTNESS OF BREATH: 1
PSYCHIATRIC NEGATIVE: 1
APNEA: 0
MYALGIAS: 1
SHORTNESS OF BREATH: 0
WHEEZING: 1

## 2023-01-05 ASSESSMENT — COGNITIVE AND FUNCTIONAL STATUS - GENERAL
DRESSING REGULAR UPPER BODY CLOTHING: A LITTLE
DRESSING REGULAR LOWER BODY CLOTHING: A LITTLE
STANDING UP FROM CHAIR USING ARMS: A LITTLE
DAILY ACTIVITIY SCORE: 20
MOBILITY SCORE: 16
TOILETING: A LITTLE
MOVING TO AND FROM BED TO CHAIR: A LITTLE
HELP NEEDED FOR BATHING: A LITTLE
CLIMB 3 TO 5 STEPS WITH RAILING: A LOT
SUGGESTED CMS G CODE MODIFIER DAILY ACTIVITY: CJ
WALKING IN HOSPITAL ROOM: A LOT
SUGGESTED CMS G CODE MODIFIER MOBILITY: CK
TURNING FROM BACK TO SIDE WHILE IN FLAT BAD: A LITTLE
MOVING FROM LYING ON BACK TO SITTING ON SIDE OF FLAT BED: A LITTLE

## 2023-01-05 ASSESSMENT — PAIN DESCRIPTION - PAIN TYPE
TYPE: ACUTE PAIN

## 2023-01-05 ASSESSMENT — FIBROSIS 4 INDEX
FIB4 SCORE: 0.34
FIB4 SCORE: 0.34

## 2023-01-05 NOTE — DISCHARGE PLANNING
Per son, he would like for patient/mother to receive [facility] Informed son that patient would have to be in agreement for SNF and therapy recommendations would have to reflect SNF. CM will reach out to attending for PT/OT consult. Will continue to follow for d/c needs.

## 2023-01-05 NOTE — PROGRESS NOTES
Report received, pt care assumed, tele box on. VSS, pt assessment complete. Pt aaox4, no signs of distress noted at this time. POC discussed with pt and verbalizes no questions.  Pt denies any additional needs at this time. Bed in lowest position, bed alarm on, pt educated on fall risk and verbalized understanding, call light within reach, will continue to monitor.

## 2023-01-05 NOTE — DISCHARGE PLANNING
Care Transition Team Assessment    RN BENJAMIN spoke with patient at bedside. Role of CM explained. Patient states she lives in [place] and was here visiting her son. Patient was IADL's prior to admission. She has a cane that she brought with her but does not have to use. Otherwise, no DME or outpatient services. Patient states she has cancelled flight home to [place] due to hospital admission and will stay at son's home until ready to fly back. CM will continue to follow for d/c needs.     Information Source  Orientation Level: Oriented X4  Information Given By: Patient  Who is responsible for making decisions for patient? : Patient    Elopement Risk  Legal Hold: No  Ambulatory or Self Mobile in Wheelchair: No-Not an Elopement Risk  Disoriented: No  Psychiatric Symptoms: None  History of Wandering: No  Elopement this Admit: No  Vocalizing Wanting to Leave: No  Displays Behaviors, Body Language Wanting to Leave: No-Not at Risk for Elopement  Elopement Risk: Not at Risk for Elopement    Interdisciplinary Discharge Planning  Lives with - Patient's Self Care Capacity: Alone and Able to Care For Self  Patient or legal guardian wants to designate a caregiver: No  Support Systems: Family Member(s), Children, Friends / Neighbors  Housing / Facility: [address]  Do You Take your Prescribed Medications Regularly: Yes  Able to Return to Previous ADL's: Yes  Mobility Issues: No  Prior Services: None  Assistance Needed: No  Durable Medical Equipment: Other - Specify (has a cane)    Discharge Preparedness  What is your plan after discharge?: Home with help (return to son's house until flying home)  What are your discharge supports?: Child  Prior Functional Level: Independent with Activities of Daily Living  Difficulity with ADLs: None  Difficulity with IADLs: None    Functional Assesment  Prior Functional Level: Independent with Activities of Daily Living    Vision / Hearing Impairment  Vision Impairment : Yes  Right Eye Vision:  Impaired, Wears Glasses  Left Eye Vision: Impaired, Wears Glasses  Hearing Impairment : No    Domestic Abuse  Have you ever been the victim of abuse or violence?: No  Physical Abuse or Sexual Abuse: No  Verbal Abuse or Emotional Abuse: No  Possible Abuse/Neglect Reported to:: Not Applicable    Anticipated Discharge Information  Discharge Disposition: Discharged to home/self care (01)

## 2023-01-05 NOTE — PROGRESS NOTES
Cardiology Follow Up Progress Note    Date of Service  1/5/2023    Attending Physician  Ramos Flores M.D.    Chief Complaint   New A. lamar with new cardiomyopathy LVEF 35%.    HPI  Rose Marie Romero is a 82 y.o. female visiting her son in [place] from [place] with a PMH of ocular CVA, on statin therapy polycythemia admitted 1/2/2023 with palpitations/shortness of breath found to be COVID-positive.  In addition she was also found to be in newly diagnosed atrial fibrillation.  Echocardiogram demonstrated moderately reduced LV systolic function.    Interim Events  No overnight cardiac events.  Telemetry-SR on amiodarone.  Transition to oral amiodarone 400 twice daily x1 week then 200 mg daily.  Obtain limited echo to evaluate LVEF now that he is in sinus rhythm.  Cancel MARC /cardioversion    Review of Systems  Review of Systems   Constitutional:  Positive for fatigue.   Respiratory:  Negative for apnea, cough, choking, chest tightness and shortness of breath.      Vital signs in last 24 hours  Temp:  [36 °C (96.8 °F)-36.9 °C (98.4 °F)] 36.3 °C (97.3 °F)  Pulse:  [65-75] 65  Resp:  [16-18] 17  BP: ()/(51-82) 125/70  SpO2:  [90 %-97 %] 95 %    Physical Exam  Physical Exam  Cardiovascular:      Rate and Rhythm: Normal rate and regular rhythm.      Pulses: Normal pulses.   Pulmonary:      Effort: Pulmonary effort is normal.   Skin:     General: Skin is warm.   Neurological:      Mental Status: She is alert. Mental status is at baseline.   Psychiatric:         Mood and Affect: Mood normal.       Lab Review  Lab Results   Component Value Date/Time    WBC 14.7 (H) 01/05/2023 02:35 AM    RBC 5.93 (H) 01/05/2023 02:35 AM    HEMOGLOBIN 11.5 (L) 01/05/2023 02:35 AM    HEMATOCRIT 39.8 01/05/2023 02:35 AM    MCV 67.1 (L) 01/05/2023 02:35 AM    MCH 19.4 (L) 01/05/2023 02:35 AM    MCHC 28.9 (L) 01/05/2023 02:35 AM    MPV 11.3 01/05/2023 02:35 AM      Lab Results   Component Value Date/Time    SODIUM 130 (L) 01/05/2023  02:35 AM    POTASSIUM 3.7 01/05/2023 02:35 AM    CHLORIDE 94 (L) 01/05/2023 02:35 AM    CO2 26 01/05/2023 02:35 AM    GLUCOSE 110 (H) 01/05/2023 02:35 AM    BUN 17 01/05/2023 02:35 AM    CREATININE 0.69 01/05/2023 02:35 AM      Lab Results   Component Value Date/Time    ASTSGOT 11 (L) 01/03/2023 04:37 AM    ALTSGPT 11 01/03/2023 04:37 AM     Lab Results   Component Value Date/Time    TROPONINT 18 01/04/2023 06:01 PM       Recent Labs     01/02/23  1652   NTPROBNP 4703*       Cardiac Imaging and Procedures Review  EKG:  My personal interpretation of the EKG dated 1/2/2023 is atrial fibrillation, heart rate 116.    Echocardiogram:    Moderately reduced left ventricular systolic function.  The left ventricular ejection fraction is visually estimated to be 35%.  Global hypokinesis with regional variation.  Mild to moderate mitral regurgitation.  Aortic valve sclerosis without stenosis.  Normal right ventricular size and systolic function.  Severely dilated left atrium. Enlarged right atrium.  Pleural effusion present.  No prior study is available for comparison.         Cardiac Catheterization: n/a     Imaging  Chest X-Ray:  Small left pleural effusion with atelectasis and/or consolidation.     Stress Test:  n/a    Assessment/Plan  #New A. fib RVR  #New findings of cardiomyopathy LVEF 35%  #Acute HFrEF NYHA class III stage C  #Severely dilated left atrium  #Covid pneumonia  #Mild to moderate MR  #Polycythemia vera  #Ocular CVA 2020    Recommendations  -Obtain Limited echo to evaluate LVEF in sinus rhythm.  -Transition IV amiodarone to oral 400 p.o. twice daily x one week and then 200 mg daily.  -Apixaban 5 mg twice daily.  -For new cardiomyopathy continue losartan 25 q. evening, Toprol-XL 50 daily.  -Holding spironolactone for worsening hyponatremia.  -Continue torsemide 5 mg daily.  -Keep K above 4, Mg above 2.  -BMP in am      I personally spent a total of 10 minutes which includes face-to-face time and  non-face-to-face time spent on preparing to see the patient, reviewing hospital notes and tests, obtaining history from the patient, performing a medically appropriate exam, counseling and educating the patient, ordering medications/tests/procedures/referrals as clinically indicated, and documenting information in the electronic medical record.     Thank you for allowing me to participate in the care of this patient.  Cardiology will sign off on this patient    Please contact me with any questions.    TIMMY Muñoz.   Cardiologist, [facility]  [phone]

## 2023-01-05 NOTE — PROGRESS NOTES
4 Eyes Skin Assessment Completed by JOHANA Molina and ANDREW Carey.    Head WDL  Ears WDL  Nose WDL  Mouth WDL  Neck WDL  Breast/Chest WDL  Shoulder Blades WDL  Spine WDL  (R) Arm/Elbow/Hand Redness and Blanching  (L) Arm/Elbow/Hand Redness and Blanching  Abdomen WDL  Groin WDL  Scrotum/Coccyx/Buttocks Redness and Blanching  (R) Leg WDL  (L) Leg WDL  (R) Heel/Foot/Toe Redness and Blanching  (L) Heel/Foot/Toe Redness and Blanching          Devices In Places Tele Box and Nasal Cannula      Interventions In Place Gray Ear Foams, Pillows, and Low Air Loss Mattress    Possible Skin Injury No    Pictures Uploaded Into Epic N/A  Wound Consult Placed N/A  RN Wound Prevention Protocol Ordered Yes

## 2023-01-05 NOTE — CARE PLAN
The patient is Stable - Low risk of patient condition declining or worsening    Shift Goals  Clinical Goals: O2 needs decrease  Patient Goals: POC  Family Goals: POC    Progress made toward(s) clinical / shift goals:        Problem: Knowledge Deficit - Standard  Goal: Patient and family/care givers will demonstrate understanding of plan of care, disease process/condition, diagnostic tests and medications  Outcome: Progressing     Problem: Fall Risk  Goal: Patient will remain free from falls  Outcome: Progressing     Problem: Hemodynamics  Goal: Patient's hemodynamics, fluid balance and neurologic status will be stable or improve  Outcome: Progressing     Problem: Respiratory  Goal: Patient will achieve/maintain optimum respiratory ventilation and gas exchange  Outcome: Progressing       Patient is not progressing towards the following goals:

## 2023-01-05 NOTE — CARE PLAN
The patient is Stable - Low risk of patient condition declining or worsening    Shift Goals  Clinical Goals: MARC, controlled HR, no CP  Patient Goals: rest, HR control  Family Goals: not at bedside    Progress made toward(s) clinical / shift goals:  Pt on amio drip, converted to NSR, complaining of cough, mobilized with assistance     Patient is not progressing towards the following goals:      Problem: Knowledge Deficit - Standard  Goal: Patient and family/care givers will demonstrate understanding of plan of care, disease process/condition, diagnostic tests and medications  Description: Target End Date:  1-3 days or as soon as patient condition allows    Document in Patient Education    1.  Patient and family/caregiver oriented to unit, equipment, visitation policy and means for communicating concern  2.  Complete/review Learning Assessment  3.  Assess knowledge level of disease process/condition, treatment plan, diagnostic tests and medications  4.  Explain disease process/condition, treatment plan, diagnostic tests and medications  Outcome: Progressing

## 2023-01-06 ENCOUNTER — PHARMACY VISIT (OUTPATIENT)
Dept: PHARMACY | Facility: MEDICAL CENTER | Age: 83
End: 2023-01-06
Payer: MEDICARE

## 2023-01-06 ENCOUNTER — HOSPITAL ENCOUNTER (INPATIENT)
Facility: REHABILITATION | Age: 83
End: 2023-01-06
Attending: PHYSICAL MEDICINE & REHABILITATION | Admitting: PHYSICAL MEDICINE & REHABILITATION

## 2023-01-06 VITALS
BODY MASS INDEX: 25.89 KG/M2 | RESPIRATION RATE: 18 BRPM | DIASTOLIC BLOOD PRESSURE: 78 MMHG | OXYGEN SATURATION: 93 % | TEMPERATURE: 96.8 F | HEIGHT: 64 IN | HEART RATE: 70 BPM | SYSTOLIC BLOOD PRESSURE: 132 MMHG | WEIGHT: 151.68 LBS

## 2023-01-06 LAB
ANION GAP SERPL CALC-SCNC: 10 MMOL/L (ref 7–16)
BUN SERPL-MCNC: 14 MG/DL (ref 8–22)
CALCIUM SERPL-MCNC: 9.1 MG/DL (ref 8.5–10.5)
CHLORIDE SERPL-SCNC: 99 MMOL/L (ref 96–112)
CO2 SERPL-SCNC: 24 MMOL/L (ref 20–33)
CREAT SERPL-MCNC: 0.68 MG/DL (ref 0.5–1.4)
ERYTHROCYTE [DISTWIDTH] IN BLOOD BY AUTOMATED COUNT: 52.8 FL (ref 35.9–50)
GFR SERPLBLD CREATININE-BSD FMLA CKD-EPI: 88 ML/MIN/1.73 M 2
GLUCOSE SERPL-MCNC: 114 MG/DL (ref 65–99)
HCT VFR BLD AUTO: 42.8 % (ref 37–47)
HGB BLD-MCNC: 12 G/DL (ref 12–16)
MCH RBC QN AUTO: 18.9 PG (ref 27–33)
MCHC RBC AUTO-ENTMCNC: 28 G/DL (ref 33.6–35)
MCV RBC AUTO: 67.5 FL (ref 81.4–97.8)
PLATELET # BLD AUTO: 784 K/UL (ref 164–446)
PMV BLD AUTO: 11.1 FL (ref 9–12.9)
POTASSIUM SERPL-SCNC: 3.8 MMOL/L (ref 3.6–5.5)
RBC # BLD AUTO: 6.34 M/UL (ref 4.2–5.4)
SARS-COV+SARS-COV-2 AG RESP QL IA.RAPID: NOTDETECTED
SODIUM SERPL-SCNC: 133 MMOL/L (ref 135–145)
SPECIMEN SOURCE: NORMAL
WBC # BLD AUTO: 16.4 K/UL (ref 4.8–10.8)

## 2023-01-06 PROCEDURE — 80048 BASIC METABOLIC PNL TOTAL CA: CPT

## 2023-01-06 PROCEDURE — 97162 PT EVAL MOD COMPLEX 30 MIN: CPT

## 2023-01-06 PROCEDURE — 97535 SELF CARE MNGMENT TRAINING: CPT

## 2023-01-06 PROCEDURE — A9270 NON-COVERED ITEM OR SERVICE: HCPCS | Performed by: INTERNAL MEDICINE

## 2023-01-06 PROCEDURE — 700102 HCHG RX REV CODE 250 W/ 637 OVERRIDE(OP): Performed by: STUDENT IN AN ORGANIZED HEALTH CARE EDUCATION/TRAINING PROGRAM

## 2023-01-06 PROCEDURE — RXMED WILLOW AMBULATORY MEDICATION CHARGE: Performed by: STUDENT IN AN ORGANIZED HEALTH CARE EDUCATION/TRAINING PROGRAM

## 2023-01-06 PROCEDURE — 85027 COMPLETE CBC AUTOMATED: CPT

## 2023-01-06 PROCEDURE — 99239 HOSP IP/OBS DSCHRG MGMT >30: CPT | Performed by: STUDENT IN AN ORGANIZED HEALTH CARE EDUCATION/TRAINING PROGRAM

## 2023-01-06 PROCEDURE — 700102 HCHG RX REV CODE 250 W/ 637 OVERRIDE(OP): Performed by: INTERNAL MEDICINE

## 2023-01-06 PROCEDURE — 700102 HCHG RX REV CODE 250 W/ 637 OVERRIDE(OP): Performed by: NURSE PRACTITIONER

## 2023-01-06 PROCEDURE — 87426 SARSCOV CORONAVIRUS AG IA: CPT

## 2023-01-06 PROCEDURE — 97166 OT EVAL MOD COMPLEX 45 MIN: CPT

## 2023-01-06 PROCEDURE — A9270 NON-COVERED ITEM OR SERVICE: HCPCS | Performed by: NURSE PRACTITIONER

## 2023-01-06 PROCEDURE — A9270 NON-COVERED ITEM OR SERVICE: HCPCS | Performed by: STUDENT IN AN ORGANIZED HEALTH CARE EDUCATION/TRAINING PROGRAM

## 2023-01-06 PROCEDURE — 36415 COLL VENOUS BLD VENIPUNCTURE: CPT

## 2023-01-06 RX ORDER — DEXAMETHASONE 4 MG/1
6 TABLET ORAL DAILY
Status: DISCONTINUED | OUTPATIENT
Start: 2023-01-06 | End: 2023-01-06 | Stop reason: HOSPADM

## 2023-01-06 RX ORDER — AMIODARONE HYDROCHLORIDE 200 MG/1
200 TABLET ORAL DAILY
Qty: 30 TABLET | Refills: 3 | Status: SHIPPED | OUTPATIENT
Start: 2023-01-13

## 2023-01-06 RX ORDER — METOPROLOL SUCCINATE 50 MG/1
50 TABLET, EXTENDED RELEASE ORAL DAILY
Qty: 30 TABLET | Status: SHIPPED
Start: 2023-01-07

## 2023-01-06 RX ORDER — TORSEMIDE 5 MG/1
5 TABLET ORAL DAILY
Qty: 30 TABLET | Refills: 3 | Status: SHIPPED
Start: 2023-01-07

## 2023-01-06 RX ORDER — LOSARTAN POTASSIUM 25 MG/1
25 TABLET ORAL EVERY EVENING
Qty: 30 TABLET | Status: SHIPPED
Start: 2023-01-06

## 2023-01-06 RX ORDER — AMIODARONE HYDROCHLORIDE 400 MG/1
400 TABLET ORAL 2 TIMES DAILY
Qty: 30 TABLET | Status: SHIPPED
Start: 2023-01-06 | End: 2023-01-12

## 2023-01-06 RX ADMIN — APIXABAN 5 MG: 5 TABLET, FILM COATED ORAL at 06:01

## 2023-01-06 RX ADMIN — METOPROLOL SUCCINATE 50 MG: 50 TABLET, EXTENDED RELEASE ORAL at 06:01

## 2023-01-06 RX ADMIN — DEXAMETHASONE 6 MG: 4 TABLET ORAL at 09:23

## 2023-01-06 RX ADMIN — AMIODARONE HYDROCHLORIDE 400 MG: 200 TABLET ORAL at 06:02

## 2023-01-06 RX ADMIN — ROSUVASTATIN 5 MG: 10 TABLET, FILM COATED ORAL at 06:02

## 2023-01-06 RX ADMIN — OMEPRAZOLE 20 MG: 20 CAPSULE, DELAYED RELEASE ORAL at 06:02

## 2023-01-06 RX ADMIN — ACETAMINOPHEN 650 MG: 325 TABLET ORAL at 13:38

## 2023-01-06 RX ADMIN — TORSEMIDE 5 MG: 5 TABLET ORAL at 06:01

## 2023-01-06 ASSESSMENT — COGNITIVE AND FUNCTIONAL STATUS - GENERAL
TURNING FROM BACK TO SIDE WHILE IN FLAT BAD: A LITTLE
STANDING UP FROM CHAIR USING ARMS: A LITTLE
MOVING TO AND FROM BED TO CHAIR: A LOT
DRESSING REGULAR LOWER BODY CLOTHING: A LITTLE
DRESSING REGULAR UPPER BODY CLOTHING: A LITTLE
HELP NEEDED FOR BATHING: A LITTLE
CLIMB 3 TO 5 STEPS WITH RAILING: A LOT
WALKING IN HOSPITAL ROOM: A LITTLE
SUGGESTED CMS G CODE MODIFIER MOBILITY: CL
SUGGESTED CMS G CODE MODIFIER DAILY ACTIVITY: CK
MOBILITY SCORE: 14
TOILETING: A LITTLE
MOVING FROM LYING ON BACK TO SITTING ON SIDE OF FLAT BED: UNABLE
PERSONAL GROOMING: A LITTLE
DAILY ACTIVITIY SCORE: 19

## 2023-01-06 ASSESSMENT — GAIT ASSESSMENTS
ASSISTIVE DEVICE: FRONT WHEEL WALKER
DEVIATION: BRADYKINETIC;DECREASED HEEL STRIKE;DECREASED TOE OFF
DISTANCE (FEET): 15
GAIT LEVEL OF ASSIST: CONTACT GUARD ASSIST

## 2023-01-06 ASSESSMENT — PAIN DESCRIPTION - PAIN TYPE: TYPE: ACUTE PAIN

## 2023-01-06 ASSESSMENT — ACTIVITIES OF DAILY LIVING (ADL): TOILETING: INDEPENDENT

## 2023-01-06 NOTE — CARE PLAN
Problem: Hemodynamics  Goal: Patient's hemodynamics, fluid balance and neurologic status will be stable or improve  Outcome: Progressing     Problem: Respiratory  Goal: Patient will achieve/maintain optimum respiratory ventilation and gas exchange  Outcome: Progressing     Problem: Pain - Standard  Goal: Alleviation of pain or a reduction in pain to the patient’s comfort goal  Outcome: Progressing   The patient is Watcher - Medium risk of patient condition declining or worsening    Shift Goals  Clinical Goals: monitor HR and wean O2  Patient Goals: rest  Family Goals: SANDRA    Progress made toward(s) clinical / shift goals:  attempt to wean O2 and monitor respiratory status    Patient is not progressing towards the following goals:

## 2023-01-06 NOTE — DISCHARGE PLANNING
"Patient accepted at [facility] ([facility]). They will send their van to p/u patient at 3pm via w/c. Informed them patient was on 1-2L O2. Notified patient of acceptance. CM also called and notified patient's son \"Marcelo\". Primary nurse & attending MD notified.  "

## 2023-01-06 NOTE — DISCHARGE SUMMARY
Discharge Summary    CHIEF COMPLAINT ON ADMISSION  Chief Complaint   Patient presents with    Rapid Heart Beat     New onset afib       Reason for Admission  EMS     Admission Date  1/2/2023    CODE STATUS  Full Code    HPI & HOSPITAL COURSE  This is a 82 y.o. female here with rapid heartbeat  Ms. Rose Marie Romero is a 82 y.o. female with history of polycythemia vera who presented 1/2/2023 at the recommendation of urgent care in the setting of new onset A. fib with rapid ventricular rate.      Patient initially presented to urgent care for follow-up after having been ill due to COVID pneumonia and she experienced on December 23, 2022.  At that time she was discharged with Z-Joe and told to follow-up should her symptoms worsen or fail to resolve.  She presented today in setting of shortness of breath, and found to have new onset A. fib with RVR.     Evaluation of the emergency department revealed heart rate ranging from  with normal blood pressure.  Labs notable for elevated BNP of 4703, normal troponin, normal TSH.  Chest x-ray reviewed and noted to have left pleural effusion.     Patient received IV diltiazem 10 mg x 2 in addition to 50 mg of metoprolol.  Patient's heart rate did improve, still remains in atrial fibrillation.  Patient admitted into the observation unit for further monitoring.    An echocardiogram has also been obtained which noted an LVEF approximately 35%.  Patient exhibits some heart failure symptoms including requirement of oxygen along with mild shortness of breath.  Cardiology consulted for management of care.  Patient was evaluated by cardiology and shows medications adjusted as appropriate.  Patient was medically optimized for transition to skilled nursing facility and rehabilitation on day of discharge.  Medications are reflected below.  Therefore, she is discharged in good and stable condition to skilled nursing facility.    The patient met 2-midnight criteria for an inpatient  stay at the time of discharge.    Discharge Date  1/6/2023:      FOLLOW UP ITEMS POST DISCHARGE  Take all medication as prescribed  Go to all follow-up appointments as indicated  Participate in rehabilitation    DISCHARGE DIAGNOSES  Principal Problem:    Atrial fibrillation with rapid ventricular response (HCC) POA: Yes  Active Problems:    SIRS (systemic inflammatory response syndrome) (HCC) POA: Unknown    Polycythemia vera (HCC) POA: Unknown      Overview: Patient with history of polycythemia vera, noted to have chronically       elevated WBC, platelets as well      - Continue outpatient follow-up and monitoring    Elevated brain natriuretic peptide (BNP) level POA: Unknown    Acute systolic heart failure (HCC) POA: Unknown    Atrial fibrillation with RVR (HCC) POA: Yes  Resolved Problems:    * No resolved hospital problems. *      FOLLOW UP  Future Appointments   Date Time Provider Department Center   2/1/2023 10:00 AM [facility] None     No follow-up provider specified.    MEDICATIONS ON DISCHARGE     Medication List        START taking these medications        Instructions   * amiodarone 400 MG tablet  Commonly known as: PACERONE   Take 1 Tablet by mouth 2 times a day for 6 days.  Dose: 400 mg     * amiodarone 200 MG Tabs  Start taking on: January 13, 2023  Commonly known as: Cordarone   Take 1 Tablet by mouth every day.  Dose: 200 mg     apixaban 5mg Tabs  Commonly known as: ELIQUIS   Take 1 Tablet by mouth 2 times a day. Indications: Thromboembolism secondary to Atrial Fibrillation  Dose: 5 mg     losartan 25 MG Tabs  Commonly known as: COZAAR   Take 1 Tablet by mouth every evening.  Dose: 25 mg     metoprolol SR 50 MG Tb24  Start taking on: January 7, 2023  Commonly known as: TOPROL XL   Take 1 Tablet by mouth every day.  Dose: 50 mg     torsemide 5 MG Tabs  Start taking on: January 7, 2023  Commonly known as: DEMADEX   Take 1 Tablet by mouth every day.  Dose: 5 mg           * This list  has 2 medication(s) that are the same as other medications prescribed for you. Read the directions carefully, and ask your doctor or other care provider to review them with you.                CONTINUE taking these medications        Instructions   acetaminophen 500 MG Tabs  Commonly known as: TYLENOL   Take 1,000 mg by mouth 1 time a day as needed for Fever or Mild Pain. 1000 mg = 2 tablets  Dose: 1,000 mg     esomeprazole 20 MG capsule  Commonly known as: NEXIUM   Take 20 mg by mouth every morning before breakfast.  Dose: 20 mg     rosuvastatin 5 MG Tabs  Commonly known as: CRESTOR   Take 5 mg by mouth every day.  Dose: 5 mg            STOP taking these medications      aspirin EC 81 MG Tbec  Commonly known as: ECOTRIN     D3 PO     famotidine 10 MG tablet  Commonly known as: PEPCID     MAGNESIUM PO     Zithromax 250 MG Tabs  Generic drug: azithromycin              Allergies  Allergies   Allergen Reactions    Doxycycline Unspecified     Per pt went blind     Latex Rash and Itching     .    Morphine Itching    Other Drug Itching     Any medications that effect eyes     Penicillins Nausea       DIET  Orders Placed This Encounter   Procedures    Diet Order Diet: Cardiac     Standing Status:   Standing     Number of Occurrences:   1     Order Specific Question:   Diet:     Answer:   Cardiac [6]       ACTIVITY  As tolerated and directed by skilled nursing.  Weight bearing as tolerated    CONSULTATIONS  Cardiology    PROCEDURES  None    LABORATORY  Lab Results   Component Value Date    SODIUM 133 (L) 01/06/2023    POTASSIUM 3.8 01/06/2023    CHLORIDE 99 01/06/2023    CO2 24 01/06/2023    GLUCOSE 114 (H) 01/06/2023    BUN 14 01/06/2023    CREATININE 0.68 01/06/2023        Lab Results   Component Value Date    WBC 16.4 (H) 01/06/2023    HEMOGLOBIN 12.0 01/06/2023    HEMATOCRIT 42.8 01/06/2023    PLATELETCT 784 (H) 01/06/2023      Please note that this dictation was created using voice recognition software. I have made  every reasonable attempt to correct obvious errors, but I expect that there are errors of grammar and possibly context that I did not discover before finalizing the note.    Total time of the discharge process exceeds 35 minutes.

## 2023-01-06 NOTE — PROGRESS NOTES
Report received from Nini Molina. Assumed pt care. Pt resting in bed. Pt A&O x 4. Fall precautions in place, call light and belongings within reach, bed in lowest position. No signs of distress.

## 2023-01-06 NOTE — DISCHARGE INSTRUCTIONS
Diet    Heart Healthy Diet    A Heart-Healthy diet includes increasing healthy fats and limiting unhealthy fats.  Focus on eating a balance of foods, including fruits and vegetables, low-fat or nonfat dairy, lean protein, nuts and legumes, whole grains, and heart-healthy oils and fats.  Monitor your salt (sodium) intake, especially if you have high blood pressure.  Lose weight if you are overweight. Losing just 5-10% of your body weight can help your overall health and prevent diseases such as diabetes and heart disease.  Activity    Resume Your Normal Activity    You may resume your normal activity as tolerated.  Rest as needed.

## 2023-01-06 NOTE — THERAPY
"Physical Therapy   Initial Evaluation     Patient Name: Rose Marie Romero  Age:  82 y.o., Sex:  female  Medical Record #: [id number]  Today's Date: 1/6/2023     Precautions  Precautions: Fall Risk  Comments: new supplemental O2 needs    Assessment  Patient is 82 y.o. female with palpitations and shortness of breath. Pt is Covid positive since 12/23 and diagnosed with new-onset Afib with RVR. PMH significant for polycythemia vera, CVA, cholelithiasis.    Pt received in bed and agreeable to PT session. Pt presents with generalized weakness, impaired balance, and decreased tolerance to activity with shortness of breath. Pt required min A to mobilize with no assistive device and CGA with the FWW. Pt tolerated ambulating in the room, but was short of breath. Stairs not attempted secondary to weakness and shortness of breath. Pt is agreeable to and would benefit from SNF for further rehab prior to return home. Will continue to follow for acute PT to progress.     Plan    Physical Therapy Initial Treatment Plan   Treatment Plan : Bed Mobility, Gait Training, Neuro Re-Education / Balance, Self Care / Home Evaluation, Stair Training, Therapeutic Activities, Therapeutic Exercise  Treatment Frequency: 4 Times per Week  Duration: Until Therapy Goals Met    DC Equipment Recommendations: Unable to determine at this time  Discharge Recommendations: Recommend post-acute placement for additional physical therapy services prior to discharge home       Subjective    \"I haven't been out of bed much since I got here\"     Objective       01/06/23 0901   Precautions   Precautions Fall Risk   Vitals   Vitals Comments Pt was SOB with exertion, required increase to 2L during mobility. Returned to 1L at rest with pt sat in low 90's   Pain 0 - 10 Group   Therapist Pain Assessment Post Activity Pain Same as Prior to Activity;Nurse Notified   Prior Living Situation   Prior Services None   Housing / Facility 2 Story House   Steps Into Home 0 "   Steps In Home   (flight of stairs)   Equipment Owned None   Lives with - Patient's Self Care Capacity Adult Children   Comments Pt is from [place] and is visiting her son in [place]. Pt's son has a 2SH and pt is staying in a 2nd floor bedroom.   Prior Level of Functional Mobility   Comments Typically fully independent with no DME at baseline   History of Falls   History of Falls No   Passive ROM Lower Body   Passive ROM Lower Body WDL   Active ROM Lower Body    Active ROM Lower Body  WDL   Strength Lower Body   Comments Grossly 3+/5 in BLE   Sensation Lower Body   Comments Denies LE sensory changes   Lower Body Muscle Tone   Lower Body Muscle Tone  WDL   Coordination Lower Body    Coordination Lower Body  WDL   Balance Assessment   Sitting Balance (Static) Fair +   Sitting Balance (Dynamic) Fair +   Standing Balance (Static) Fair -   Standing Balance (Dynamic) Fair -   Weight Shift Sitting Fair   Weight Shift Standing Fair   Comments w/ min A initially and then w/ FWW   Bed Mobility    Supine to Sit Standby Assist   Scooting Standby Assist   Comments extra time, HOB elevated   Gait Analysis   Gait Level Of Assist Contact Guard Assist   Assistive Device Front Wheel Walker   Distance (Feet) 15   # of Times Distance was Traveled 2   Deviation Bradykinetic;Decreased Heel Strike;Decreased Toe Off   Comments Pt required min A for ambulating to the bathroom with HHA. Required CGA with FWW.   Functional Mobility   Sit to Stand Minimal Assist   Bed, Chair, Wheelchair Transfer Contact Guard Assist   Toilet Transfers Minimal Assist   Transfer Method Stand Step   Mobility up with HHA vs FWW   How much difficulty does the patient currently have...   Turning over in bed (including adjusting bedclothes, sheets and blankets)? 3   Sitting down on and standing up from a chair with arms (e.g., wheelchair, bedside commode, etc.) 1   Moving from lying on back to sitting on the side of the bed? 2   How much help from another person does  the patient currently need...   Moving to and from a bed to a chair (including a wheelchair)? 3   Need to walk in a hospital room? 3   Climbing 3-5 steps with a railing? 2   6 clicks Mobility Score 14   Short Term Goals    Short Term Goal # 1 Pt will transfer with FWW and supervision to progress function in 6 visits.   Short Term Goal # 2 Pt will ambulate 100ft with FWW and supervision to progress function in 6 visits.   Short Term Goal # 3 Pt will negotiate 8+ stairs with min A to progress function in 6 visits.   Education Group   Education Provided Role of Physical Therapist   Role of Physical Therapist Patient Response Patient;Acceptance;Explanation;Verbal Demonstration   Physical Therapy Initial Treatment Plan    Treatment Plan  Bed Mobility;Gait Training;Neuro Re-Education / Balance;Self Care / Home Evaluation;Stair Training;Therapeutic Activities;Therapeutic Exercise   Treatment Frequency 4 Times per Week   Duration Until Therapy Goals Met   Problem List    Problems Impaired Bed Mobility;Impaired Transfers;Impaired Ambulation;Functional Strength Deficit;Decreased Activity Tolerance   Anticipated Discharge Equipment and Recommendations   DC Equipment Recommendations Unable to determine at this time   Discharge Recommendations Recommend post-acute placement for additional physical therapy services prior to discharge home   Interdisciplinary Plan of Care Collaboration   IDT Collaboration with  Nursing;Occupational Therapist   Patient Position at End of Therapy Seated;Call Light within Reach;Tray Table within Reach;Phone within Reach   Collaboration Comments RN updated

## 2023-01-06 NOTE — PROGRESS NOTES
[facility] Medicine Daily Progress Note    Date of Service  1/5/2023    Chief Complaint  Rose Marie Romero is a 82 y.o. female admitted 1/2/2023 with palpitations and SOB    Hospital Course  Ms. Rose Marie Romero is a 82 y.o. female with history of polycythemia vera who presented 1/2/2023 at the recommendation of urgent care in the setting of new onset A. fib with rapid ventricular rate.      Patient initially presented to urgent care for follow-up after having been ill due to COVID pneumonia and she experienced on December 23, 2022.  At that time she was discharged with Z-Joe and told to follow-up should her symptoms worsen or fail to resolve.  She presented today in setting of shortness of breath, and found to have new onset A. fib with RVR.     Evaluation of the emergency department revealed heart rate ranging from  with normal blood pressure.  Labs notable for elevated BNP of 4703, normal troponin, normal TSH.  Chest x-ray reviewed and noted to have left pleural effusion.     Patient received IV diltiazem 10 mg x 2 in addition to 50 mg of metoprolol.  Patient's heart rate did improve, still remains in atrial fibrillation.  Patient admitted into the observation unit for further monitoring.    An echocardiogram has also been obtained which noted an LVEF approximately 35%.  Patient exhibits some heart failure symptoms including requirement of oxygen along with mild shortness of breath.  Cardiology consulted for management of care.    Interval Problem Update  1/3/2023  -Patient seen and examined.  Discussed with patient results from echocardiogram along with plan of care and management of atrial fibrillation.  Patient still notes some mild shortness of breath, requiring oxygen, and easily fatigued when she ambulates.  -Plan of care: Continue to monitor patient; order 1 dose of IV Lasix due to SOB; consulted cardiology for management of new onset A. fib along with new onset HF  -Disposition: Patient  anticipated to stay overnight under observation for management of new onset A. fib and new onset HF  -Lab work: Reviewed; expected  -VSS at this time    1/4/2022  -Patient seen and examined prior to CTA pulmonary.  Patient still endorses some mild shortness of breath.  Patient also reports that she had sharp chest pain last night of which this went away after 5 minutes without any other intervention.  Discussed with patient plan of care in conjunction with cardiology.  -Plan of care: Cardiology initiated amiodarone gtt., to switch to oral after 24 hours loading dose; hold cardioversion/MARC due to patient being COVID positive approximately 10 days ago; pending CTA pulmonary due to elevated D-dimer, history of COVID, patient traveling; continue anticoagulation due to new onset A. fib with RVR; manage new onset heart failure due to low EF of 35%; cardiology to continue to follow  -Disposition: Patient switched to inpatient status as she is anticipated to stay 2-3 midnights for initiation of amiodarone therapy, rule out source of SOB via CTA pulmonary versus A. fib versus COVID.  -Lab work: Reviewed; expected  -VSS at this time    1/5/2023:  Appreciate cardiology recommendations anticipate discharge on 1/6/2023.  Patient has been discontinued from amiodarone infusion and has been transitioned to amiodarone orally.  Patient be discharged home with his medication furthermore patient will receive anticoagulation.    I have discussed this patient's plan of care and discharge plan at IDT rounds today with Case Management, Nursing, Nursing leadership, and other members of the IDT team.    Consultants/Specialty  Cardiology  - Dr. Merlin Rivera    Code Status  Full Code    Disposition  Patient is medically cleared for discharge.   Anticipate discharge to to home with close outpatient follow-up.  I have placed the appropriate orders for post-discharge needs.    Review of Systems  Review of Systems   Constitutional:   Positive for malaise/fatigue. Negative for chills and fever.   HENT: Negative.     Eyes: Negative.    Respiratory:  Positive for shortness of breath and wheezing.    Cardiovascular:  Positive for chest pain.   Gastrointestinal: Negative.    Genitourinary: Negative.    Musculoskeletal:  Positive for myalgias.   Skin: Negative.    Neurological:  Positive for weakness.   Endo/Heme/Allergies: Negative.    Psychiatric/Behavioral: Negative.        Physical Exam  Temp:  [36 °C (96.8 °F)-36.6 °C (97.9 °F)] 36.6 °C (97.9 °F)  Pulse:  [65-74] 69  Resp:  [16-18] 17  BP: (111-131)/(59-82) 121/69  SpO2:  [92 %-95 %] 93 %    Physical Exam  Vitals and nursing note reviewed.   HENT:      Head: Normocephalic.      Nose: Nose normal.      Mouth/Throat:      Mouth: Mucous membranes are moist.      Pharynx: Oropharynx is clear.   Eyes:      Pupils: Pupils are equal, round, and reactive to light.   Cardiovascular:      Rate and Rhythm: Tachycardia present. Rhythm irregular.      Pulses: Normal pulses.      Heart sounds: Normal heart sounds.   Pulmonary:      Effort: Pulmonary effort is normal.      Breath sounds: Normal breath sounds.   Abdominal:      General: Bowel sounds are normal.      Palpations: Abdomen is soft.   Musculoskeletal:         General: Tenderness present.      Cervical back: Normal range of motion and neck supple.   Skin:     General: Skin is dry.   Neurological:      Mental Status: Mental status is at baseline.       Fluids    Intake/Output Summary (Last 24 hours) at 1/5/2023 1819  Last data filed at 1/5/2023 1623  Gross per 24 hour   Intake 480 ml   Output 1000 ml   Net -520 ml         Laboratory  Recent Labs     01/03/23  0437 01/04/23  0004 01/05/23  0235   WBC 14.2* 15.1* 14.7*   RBC 6.36* 6.22* 5.93*   HEMOGLOBIN 12.0 11.8* 11.5*   HEMATOCRIT 42.1 41.4 39.8   MCV 66.2* 66.6* 67.1*   MCH 18.9* 19.0* 19.4*   MCHC 28.5* 28.5* 28.9*   RDW 49.0 49.4 49.7   PLATELETCT 848* 825* 781*   MPV 11.2 10.7 11.3       Recent  Labs     01/03/23  0437 01/04/23  0004 01/05/23  0235   SODIUM 138 134* 130*   POTASSIUM 3.6 3.6 3.7   CHLORIDE 103 96 94*   CO2 23 27 26   GLUCOSE 99 107* 110*   BUN 12 14 17   CREATININE 0.51 0.68 0.69   CALCIUM 8.8 9.2 9.2                     Imaging  EC-ECHOCARDIOGRAM LTD W/O CONT   Final Result      CT-CTA CHEST PULMONARY ARTERY W/ RECONS   Final Result      1.  No CT evidence for pulmonary emboli.   2.  Extensive LEFT lower lobe consolidation, primarily atelectasis, with possible superimposed pneumonia.   3.  Mild dependent atelectasis in the RIGHT middle and lower lobes.   4.  Minimal LEFT pleural fluid.   5.  Bilateral lower lobe bronchial secretions.            EC-ECHOCARDIOGRAM COMPLETE W/O CONT   Final Result      DX-CHEST-PORTABLE (1 VIEW)   Final Result      Small left pleural effusion with atelectasis and/or consolidation.             Assessment/Plan  * Atrial fibrillation with rapid ventricular response (HCC)- (present on admission)  Assessment & Plan  Patient with new onset atrial fibrillation and rapid ventricular response.  [id number] 3.  Suspect A. fib likely secondary to combination of recent illness and senescence.  - Discussed therapeutic anticoagulation with patient, patient understands the risk benefits and alternatives and is amenable to initiation of therapeutic anticoagulation.  - Reinitiate patient on metoprolol tartrate 50 mg twice daily  - Echo ordered - noted LVEF 35%  --Consulted Cardiology  --Recommendations for cardioversion/MARC, but need to hold due to patient being COVID-positive  --Initiated amiodarone gtt. per cardiology; to switch to oral per cardiology      Acute systolic heart failure (HCC)  Assessment & Plan  - Noted echocardiogram LVEF approximately 35%  -Patient exhibiting mild shortness of breath  -Easily gets fatigued during ambulation  -Requiring oxygen  -Ordered 1 dose of IV Lasix  -Cardiology consulted for new onset A. fib along with new onset HF  -Cardiology  initiated HF medication including metoprolol, Aldactone, torsemide, losartan, continue Lasix    Elevated brain natriuretic peptide (BNP) level  Assessment & Plan  Concern for possible underlying CHF given elevated BNP and pleural effusion on chest x-ray.  Received 1 dose of IV Lasix in ED  - Monitor I's and O's, daily weights  - Echo ordered, will f/u results  - Plan to initiate GDMT based on results of echo if necessary    Polycythemia vera (HCC)  Assessment & Plan  Patient with history of polycythemia vera, noted to have chronically elevated WBC, platelets as well  - Continue outpatient follow-up and monitoring      SIRS (systemic inflammatory response syndrome) ([facility])  Assessment & Plan  SIRS criteria identified on my evaluation include:  Tachycardia, with heart rate greater than 90 BPM and Leukocytosis, with WBC greater than 12,000  SIRS is non-infectious (WBC chronically elevated in setting of PV, normal procal), the patient does not have sepsis         Please note that this dictation was created using voice recognition software. I have made every reasonable attempt to correct obvious errors, but I expect that there are errors of grammar and possibly context that I did not discover before finalizing the note.    VTE prophylaxis: therapeutic anticoagulation with Eliquis    I have performed a physical exam and reviewed and updated ROS and Plan today (1/5/2023). In review of yesterday's note (1/4/2023), there are no changes except as documented above.

## 2023-01-06 NOTE — HEART FAILURE PROGRAM
Patient visiting her son here from [place]. Plan is to stay with her son until she's ready to return home. Per care coordination note today, patient's son has requested that patient discharge to SNF for therapy prior to discharging home with him. PT/OT will need to be ordered.     EF is 35%, new HFrEF in the setting of AF c RVR  Per Dr. Boyer's sign off today, hold MRA due to hyponatremia    Nurses: Please document HF education in the education tab and populate the HF Care Plan. These tools will guide day to day care of the HF patient.    Providers: below are Guideline Directed Medical Therapy (GDMT) for HFrEF. If any cannot be prescribed by discharge, would you please note the clinical reason for each in your discharge summary? Thanks! Sonya  Evidence Based Beta Blocker (bisoprolol, carvedilol, or toprol xl), for EF of 40% or less  BECKI - I, for EF of 40% or less ARNI is preferred If not cost prohibitive for patient  SGLT2 inhibitor with proven ASCVD, HF, or DKD benefit Jardiance/empagliflozin): If not cost prohibitive for patient  Aldosterone antagonist, for EF of 40% or less  Anticoagulation for atrial arrhythmia, if applicable  Glycemic control for DM + HF, if applicable  Lipid lowering medication for DM + HF, if applicable  Hydralazine Hydrochloride/Isosorbide Dinitrate. The combination of hydralazine and isosorbide- dinitrate is recommended to reduce morbidity and mortality for patients self-described  Americans with NYHA class III-IV HFrEF (EF 40% or less), receiving optimal therapy with ACE inhibitors and beta blockers, unless contraindicated (Class I, AISLINN: A).  Pneumococcal vaccine, if not previously received  Influenza vaccine for current season, if not previously received  Nicotine cessation counseling documented, if applicable  Device screening, if applicable  Referral to disease management program specializing in heart failure care- requested that schedulers notify me if patient cannot be  scheduled at the Heart Failure Clinic  Referral to Cardiac Rehab: Patient Criteria: Stable, chronic heart failure patients who can receive Medicare coverage are defined as patients with left ventricular ejection fraction of 35% or less and New York Heart Association (NYHA) Class II to IV symptoms on optimal heart failure therapy for at least six weeks.  7 calendar day f/u appointment scheduled prior to discharge, appearing on signed after visit summary.

## 2023-01-06 NOTE — THERAPY
"Occupational Therapy   Initial Evaluation     Patient Name: Rose Marie Romero  Age:  82 y.o., Sex:  female  Medical Record #: [id number]  Today's Date: 1/6/2023     Precautions: Fall Risk  Comments: new supplemental O2 needs    Assessment    Patient is 82 y.o. female admitted with SOB and palpitations, recent COVID dx 12/23/22 and presented to acute d/t worsening symptoms. Today pt is limited by active diarrhea and need for supplemental O2 to complete functional tasks. Pt c/o SOB while toileting on room air, pt reported feeling better with 2L NC. Pt completed extensive standing rachael-care for diarrhea clean up but required Juli stabilizing assist. Pt returned to beside chair and O2 saturations stabilized around 90-92% on 1L NC. Pt will benefit from increased time OOB, encouraged pt to eat all meals in bedside chair and complete all toileting in BR while admitted. Pts son and DIL return from out of town on Monday, she can stay with them following a post-acute placement until she is well enough to return home to [place].     Plan    Occupational Therapy Initial Treatment Plan   Treatment Interventions: Self Care / Activities of Daily Living, Therapeutic Exercises, Therapeutic Activity  Treatment Frequency: 4 Times per Week  Duration: Until Therapy Goals Met    DC Equipment Recommendations: Tub / Shower Seat  Discharge Recommendations: Recommend post-acute placement for additional occupational therapy services prior to discharge home     Subjective    \"I can't go home like this yet.\"     Objective       01/06/23 0914   Prior Living Situation   Prior Services None   Housing / Facility 2 Story House   Steps In Home 15   Bathroom Set up Walk In Shower;Bathtub / Shower Combination   Equipment Owned None   Lives with - Patient's Self Care Capacity Alone and Able to Care For Self   Comments pt lives alone in [place] but is in [place] visiting her son & his family for the holidays. Above information is her son's home.   Prior " Level of ADL Function   Self Feeding Independent   Grooming / Hygiene Independent   Bathing Independent   Dressing Independent   Toileting Independent   Prior Level of IADL Function   Medication Management Independent   Laundry Independent   Kitchen Mobility Independent   Finances Independent   Home Management Independent   Shopping Independent   Prior Level Of Mobility Independent With Device in Community;Independent With Device in Home   Precautions   Precautions Fall Risk   Comments new supplemental O2 needs   Strength Upper Body   Upper Body Strength  X   Gross Strength Generalized Weakness, Equal Bilaterally.    Coordination Upper Body   Coordination WDL   Balance Assessment   Sitting Balance (Static) Fair +   Sitting Balance (Dynamic) Fair   Standing Balance (Static) Fair   Standing Balance (Dynamic) Fair -   Weight Shift Sitting Fair   Weight Shift Standing Fair   Comments standing with FWW   Bed Mobility    Supine to Sit Contact Guard Assist   Scooting Supervised   ADL Assessment   Eating Modified Independent   Grooming Standing;Contact Guard Assist   Upper Body Dressing Minimal Assist   Lower Body Dressing Minimal Assist   Toileting Minimal Assist   How much help from another person does the patient currently need...   Putting on and taking off regular lower body clothing? 3   Bathing (including washing, rinsing, and drying)? 3   Toileting, which includes using a toilet, bedpan, or urinal? 3   Putting on and taking off regular upper body clothing? 3   Taking care of personal grooming such as brushing teeth? 3   Eating meals? 4   6 Clicks Daily Activity Score 19   Functional Mobility   Sit to Stand Standby Assist   Bed, Chair, Wheelchair Transfer Standby Assist   Toilet Transfers Minimal Assist   Transfer Method Stand Step   Mobility FWW   Activity Tolerance   Sitting in Chair 10min+   Sitting Edge of Bed 5min   Standing 3min x3   Patient / Family Goals   Patient / Family Goal #1 rehab for a few days   Short  Term Goals   Short Term Goal # 1 pt will complete toileting ADL at SPV level   Short Term Goal # 2 pt will tolerate >5min standing grooming at SPV level   Education Group   Education Provided Activities of Daily Living;Role of Occupational Therapist   Role of Occupational Therapist Patient Response Patient;Acceptance;Explanation;Verbal Demonstration   ADL Patient Response Patient;Acceptance;Explanation;Verbal Demonstration;Action Demonstration   Occupational Therapy Initial Treatment Plan    Treatment Interventions Self Care / Activities of Daily Living;Therapeutic Exercises;Therapeutic Activity   Treatment Frequency 4 Times per Week   Duration Until Therapy Goals Met   Problem List   Problem List Decreased Active Daily Living Skills;Decreased Homemaking Skills;Decreased Functional Mobility;Decreased Activity Tolerance;Safety Awareness Deficits / Cognition;Decreased Upper Extremity Strength Right;Decreased Upper Extremity Strength Left   Anticipated Discharge Equipment and Recommendations   DC Equipment Recommendations Tub / Shower Seat   Discharge Recommendations Recommend post-acute placement for additional occupational therapy services prior to discharge home

## 2023-01-06 NOTE — DOCUMENTATION QUERY
[facility]                                                                       Query Response Note      PATIENT:               TYRONE WALLIS  ACCT #:                  [id number]  MRN:                     [id number]  :                      1940  ADMIT DATE:       2023 4:08 PM  DISCH DATE:          RESPONDING  PROVIDER #:        [id number]           QUERY TEXT:    82 year old female, came in with shortness of breath and palpitations, atrial fibrillation, found to have acute CHF. Diagnosed with Covid pneumonia on 2022 and was treated with zpack. No antibiotics administered so far this stay. Can you please clarify if Covid pneumonia is relevant to this admission?       The patient's Clinical Indicators include:  Findings: 23 H&P: Recent Covid Pneumonia, went to urgent care on , diagnosed with covid pneumonia, discharged with zpack.  Presents with shortness of breath found to have new onset atrial fibrillation Chest xray: pleural effusion    1/3/23 Consult: Covid pneumonia, pleural effusion, new onset of HFrEF 35% elevated probnp   severely dilated left atrium    Treatment: 23 H&P: Received 1 dose IV Lasix in ED, Chest xray, Cardiology consult  initiated HF medication including metoprolol, Aldactone, torsemide, losartan, continue Lasix, supplemental o2    Risk Factors: Acute systolic CHF, recent covid pneumonia    Jean PaulyoSarah aguiar RN   Clinical Documentation   [email or web address]  [email or web address] via [email or web address]  Options provided:   -- Covid Pneumonia is ruled out, not an active condition   -- Covid pneumonia is ruled in, active condition during this stay   -- Other explanation, (please specify other explanation)   -- Unable to determine      Query created by: Sarah Miller on 2023 8:50 AM    RESPONSE TEXT:    Covid pneumonia is ruled in, active condition during this stay           Electronically signed by:  EDUARDO SIMMS MD 1/6/2023 11:04 AM

## 2023-01-07 NOTE — PROGRESS NOTES
Pt discharged to [facility] with medical transport via medical transport. A&O x 4, on 1L NC. Discharge instructions reviewed, and signed. IV and tele monitor removed. Pt vocalized understanding of AVS, plan of care, and medications. All questions answered at this time. Pt escorted out with medical transport staff.

## 2023-01-09 LAB
BACTERIA BLD CULT: NORMAL
BACTERIA BLD CULT: NORMAL
SIGNIFICANT IND 70042: NORMAL
SIGNIFICANT IND 70042: NORMAL
SITE SITE: NORMAL
SITE SITE: NORMAL
SOURCE SOURCE: NORMAL
SOURCE SOURCE: NORMAL

## 2025-03-13 NOTE — PROGRESS NOTES
Cardiology Follow Up Progress Note    Date of Service  1/4/2023    Attending Physician  Alejo Pedraza*    Chief Complaint   Sob and cough     Cardiology consult   Atrial fibrillation and HFrEF    HPI  Rose Marie Romero is a 82 y.o. female admitted 1/2/2023 with SOB and cough. Presented to [facility] 12/28/2022 noted to have COVID pneumonia. Went back for follow up 1/2/2023 and noted to be in afib with RVR sent to ED. Denies any angina, palpitations or dizziness.      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     She is currently visiting from [place].      ECHO showed ef 35%, global hypokinesis with regional variation, severely dilated left atrium. Pleural effusion. EKG showed afib. proBNP 4703.     Interim Events  Patient resting in bed, sob and requiring 2L of oxygen   Reverted back into NSR 80s  Elevated d dimer, plan for rule out PE    Review of Systems  Review of Systems   Constitutional:  Positive for fatigue.   Respiratory:  Positive for shortness of breath.    Cardiovascular:  Negative for chest pain, palpitations and leg swelling.   Psychiatric/Behavioral:  Negative for confusion.      Vital signs in last 24 hours  Temp:  [36.4 °C (97.5 °F)-37.1 °C (98.8 °F)] 36.8 °C (98.3 °F)  Pulse:  [] 105  Resp:  [15-18] 16  BP: (115-131)/(65-98) 131/98  SpO2:  [85 %-97 %] 95 %    Physical Exam  Physical Exam  Vitals and nursing note reviewed.   Constitutional:       Appearance: She is ill-appearing.   Cardiovascular:      Rate and Rhythm: Normal rate and regular rhythm.   Pulmonary:      Effort: Pulmonary effort is normal.   Musculoskeletal:      Right lower leg: No edema.      Left lower leg: No edema.   Neurological:      Mental Status: She is alert and oriented to person, place, and time.   Psychiatric:         Mood and Affect: Mood normal.         Behavior: Behavior normal.          Thought Content: Thought content normal.         Judgment: Judgment normal.       Lab Review  Lab Results   Component Value Date/Time    WBC 15.1 (H) 01/04/2023 12:04 AM    RBC 6.22 (H) 01/04/2023 12:04 AM    HEMOGLOBIN 11.8 (L) 01/04/2023 12:04 AM    HEMATOCRIT 41.4 01/04/2023 12:04 AM    MCV 66.6 (L) 01/04/2023 12:04 AM    MCH 19.0 (L) 01/04/2023 12:04 AM    MCHC 28.5 (L) 01/04/2023 12:04 AM    MPV 10.7 01/04/2023 12:04 AM      Lab Results   Component Value Date/Time    SODIUM 134 (L) 01/04/2023 12:04 AM    POTASSIUM 3.6 01/04/2023 12:04 AM    CHLORIDE 96 01/04/2023 12:04 AM    CO2 27 01/04/2023 12:04 AM    GLUCOSE 107 (H) 01/04/2023 12:04 AM    BUN 14 01/04/2023 12:04 AM    CREATININE 0.68 01/04/2023 12:04 AM      Lab Results   Component Value Date/Time    ASTSGOT 11 (L) 01/03/2023 04:37 AM    ALTSGPT 11 01/03/2023 04:37 AM     Lab Results   Component Value Date/Time    TROPONINT 16 01/04/2023 05:09 AM       Recent Labs     01/02/23  1652   NTPROBNP 4703*       Cardiac Imaging and Procedures Review  Telemetry showed NSR     Echocardiogram:    1/2/2023  Left Ventricle  The left ventricle is mildly dilated. Normal left ventricular wall   thickness. Moderately reduced left ventricular systolic function. The   left ventricular ejection fraction is visually estimated to be 35%.   Global hypokinesis with regional variation. A reliable estimation of   diastolic function cannot be made due to conflicting data.     Right Ventricle  Normal right ventricular size and systolic function.     Right Atrium  Enlarged right atrium.     Left Atrium  Severely dilated left atrium. Left atrial volume index is 55 mL/sq m.     Mitral Valve  Structurally normal mitral valve without stenosis. Mild to moderate   mitral regurgitation.     Aortic Valve  Aortic valve sclerosis without stenosis.   No aortic insufficiency.     Tricuspid Valve  Structurally normal tricuspid valve without stenosis. Trace tricuspid   regurgitation.     Pulmonic  Valve  Structurally normal pulmonic valve without stenosis or regurgitation.     Pericardium  Normal pericardium without effusion. Pleural effusion present.     Aorta  The ascending aorta diameter is 3.3 cm.         Assessment/Plan  No new Assessment & Plan notes have been filed under this hospital service since the last note was generated.  Service: Cardiology    New onset of atrial fibrillation with RVR;  - reverted back to NSR, echo showed severely dilated left atrium   - started on amiodarone gtt   - continue metoprolol 25mg BID    - [id number] (CHF/CM, HTN, Age, DM, CVA, Vascular disease, Gender) = 6. Continue eliquis 5mg BID   -    2. New onset of HFrEF 35%  - once euvolemic recommend outpatient ischemic evaluation   - class 3, stage C  - continue furosemide 40mg qd IV   - continue aldactone 25mg qd   - continue losartan 25mg qd   - consider adding farxiga if tolerate aldactone and arb   - strict I and O, daily stand up weight     3. mild to moderate mitral regurgitation:  - continue diuretics    4.  COVID pneumonia   - pleural effusion   - defer to primary    5. history of occular CVA 2020    6. polycytemia vera     I personally spent a total of 16 minutes which includes face-to-face time and non-face-to-face time spent on preparing to see the patient, reviewing hospital notes and tests, obtaining history from the patient, performing a medically appropriate exam, counseling and educating the patient, ordering medications/tests/procedures/referrals as clinically indicated, and documenting information in the electronic medical record.       Thank you for allowing me to participate in the care of this patient.  I will continue to follow this patient    Please contact me with any questions.    JODIE Barragan       Attending Attestation:  I personally examined and evaluated the patient and agree with the MONSTER assessment and plan except as noted.    Fortunately, she converted to sinus rhythm on her own  no this morning and we will plan to initiate an amiodarone drip overnight with likely conversion to p.o. amiodarone in the morning if she remains sinus rhythm.  This is a potentially high risk medication that requires constant telemetry monitoring for arrhythmias.  Otherwise, she has been started on a reasonable neurohormonal regimen and now appears relatively euvolemic and we will convert to oral torsemide.  She will need to follow-up with a cardiologist upon returning home to [place] and, if her EF does not recover in a reasonable time period, then consider an ischemic evaluation.    I personally spent a total of 20 minutes on this patient encounter, including direct face-to-face time and non-face-to-face time with the patient/family, preparing to see the patient, reviewing hospital notes and tests, independently interpreting tests as needed, obtaining relevant history from the patient, performing an appropriate physical exam, counseling and educating the patient/family, ordering medications/tests/procedures/referrals as clinically indicated, discussing patient care with referring physicians as needed, and documenting the encounter in the electronic medical record.

## (undated) DEVICE — SENSOR SPO2 NEO LNCS ADHESIVE (20/BX) SEE USER NOTES

## (undated) DEVICE — WATER IRRIGATION STERILE 1000ML (12EA/CA)

## (undated) DEVICE — TROCAR 5X100 NON BLADED Z-TH - READ KII (6/BX)

## (undated) DEVICE — TUBING CLEARLINK DUO-VENT - C-FLO (48EA/CA)

## (undated) DEVICE — GLOVE BIOGEL SZ 7.5 SURGICAL PF LTX - (50PR/BX 4BX/CA)

## (undated) DEVICE — MASK ANESTHESIA ADULT  - (100/CA)

## (undated) DEVICE — SUTURE 4-0 VICRYL PLUS FS-2 - 27 INCH (36/BX)

## (undated) DEVICE — SLEEVE, VASO, THIGH, MED

## (undated) DEVICE — DERMABOND ADVANCED - (12EA/BX)

## (undated) DEVICE — BAG RETRIEVAL 10ML (10EA/BX)

## (undated) DEVICE — ELECTRODE 850 FOAM ADHESIVE - HYDROGEL RADIOTRNSPRNT (50/PK)

## (undated) DEVICE — LACTATED RINGERS INJ 1000 ML - (14EA/CA 60CA/PF)

## (undated) DEVICE — SET SUCTION/IRRIGATION WITH DISPOSABLE TIP (6/CA )PART #0250-070-520 IS A SUB

## (undated) DEVICE — HEAD HOLDER JUNIOR/ADULT

## (undated) DEVICE — SET EXTENSION WITH 2 PORTS (48EA/CA) ***PART #2C8610 IS A SUBSTITUTE*****

## (undated) DEVICE — STAPLE 45MM BLUE 3.5MM ECHELON (12EA/BX)

## (undated) DEVICE — PROTECTOR ULNA NERVE - (36PR/CA)

## (undated) DEVICE — Device

## (undated) DEVICE — CANISTER SUCTION 3000ML MECHANICAL FILTER AUTO SHUTOFF MEDI-VAC NONSTERILE LF DISP  (40EA/CA)

## (undated) DEVICE — NEPTUNE 4 PORT MANIFOLD - (20/PK)

## (undated) DEVICE — SODIUM CHL IRRIGATION 0.9% 1000ML (12EA/CA)

## (undated) DEVICE — NEEDLE INSFL 120MM 14GA VRRS - (20/BX)

## (undated) DEVICE — SUTURE 0 VICRYL PLUS UR-6 - 27 INCH (36/BX)

## (undated) DEVICE — STAPLER POWERED 45MM (3EA/BX)

## (undated) DEVICE — KIT ANESTHESIA W/CIRCUIT & 3/LT BAG W/FILTER (20EA/CA)

## (undated) DEVICE — GOWN WARMING STANDARD FLEX - (30/CA)

## (undated) DEVICE — GLOVE BIOGEL INDICATOR SZ 7.5 SURGICAL PF LTX - (50PR/BX 4BX/CA)

## (undated) DEVICE — ELECTRODE DUAL RETURN W/ CORD - (50/PK)

## (undated) DEVICE — CHLORAPREP 26 ML APPLICATOR - ORANGE TINT(25/CA)

## (undated) DEVICE — CANNULA W/SEAL 5X100 Z-THRE - ADED KII (12/BX)

## (undated) DEVICE — TOWEL STOP TIMEOUT SAFETY FLAG (40EA/CA)

## (undated) DEVICE — LIGASURE LAPAROSCOPIC 5MM - (6EA/CA)

## (undated) DEVICE — STAPLER 45MM ARTICULATING - (3EA/BX)

## (undated) DEVICE — SPONGE GAUZESTER. 2X2 4-PL - (2/PK 50PK/BX 30BX/CS)

## (undated) DEVICE — SUCTION INSTRUMENT YANKAUER BULBOUS TIP W/O VENT (50EA/CA)

## (undated) DEVICE — SUTURE GENERAL

## (undated) DEVICE — DRAPESURG STERI-DRAPE LONG - (10/BX 4BX/CA)